# Patient Record
Sex: FEMALE | Race: WHITE | NOT HISPANIC OR LATINO
[De-identification: names, ages, dates, MRNs, and addresses within clinical notes are randomized per-mention and may not be internally consistent; named-entity substitution may affect disease eponyms.]

---

## 2018-03-19 ENCOUNTER — APPOINTMENT (OUTPATIENT)
Dept: OBGYN | Facility: CLINIC | Age: 48
End: 2018-03-19

## 2018-04-10 ENCOUNTER — APPOINTMENT (OUTPATIENT)
Dept: OBGYN | Facility: CLINIC | Age: 48
End: 2018-04-10
Payer: COMMERCIAL

## 2018-04-10 VITALS
DIASTOLIC BLOOD PRESSURE: 60 MMHG | HEIGHT: 66 IN | BODY MASS INDEX: 26.68 KG/M2 | SYSTOLIC BLOOD PRESSURE: 100 MMHG | WEIGHT: 166 LBS

## 2018-04-10 DIAGNOSIS — Z80.3 FAMILY HISTORY OF MALIGNANT NEOPLASM OF BREAST: ICD-10-CM

## 2018-04-10 DIAGNOSIS — Z00.00 ENCOUNTER FOR GENERAL ADULT MEDICAL EXAMINATION W/OUT ABNORMAL FINDINGS: ICD-10-CM

## 2018-04-10 DIAGNOSIS — Z87.19 PERSONAL HISTORY OF OTHER DISEASES OF THE DIGESTIVE SYSTEM: ICD-10-CM

## 2018-04-10 DIAGNOSIS — Z87.2 PERSONAL HISTORY OF DISEASES OF THE SKIN AND SUBCUTANEOUS TISSUE: ICD-10-CM

## 2018-04-10 PROCEDURE — 99212 OFFICE O/P EST SF 10 MIN: CPT | Mod: 25

## 2018-04-10 PROCEDURE — 99396 PREV VISIT EST AGE 40-64: CPT

## 2018-04-12 ENCOUNTER — TRANSCRIPTION ENCOUNTER (OUTPATIENT)
Age: 48
End: 2018-04-12

## 2018-04-16 LAB — CYTOLOGY CVX/VAG DOC THIN PREP: NORMAL

## 2018-04-27 ENCOUNTER — APPOINTMENT (OUTPATIENT)
Dept: ULTRASOUND IMAGING | Facility: IMAGING CENTER | Age: 48
End: 2018-04-27
Payer: COMMERCIAL

## 2018-04-27 ENCOUNTER — OUTPATIENT (OUTPATIENT)
Dept: OUTPATIENT SERVICES | Facility: HOSPITAL | Age: 48
LOS: 1 days | End: 2018-04-27
Payer: COMMERCIAL

## 2018-04-27 DIAGNOSIS — Z00.8 ENCOUNTER FOR OTHER GENERAL EXAMINATION: ICD-10-CM

## 2018-04-27 DIAGNOSIS — Z98.89 OTHER SPECIFIED POSTPROCEDURAL STATES: Chronic | ICD-10-CM

## 2018-04-27 PROCEDURE — 76856 US EXAM PELVIC COMPLETE: CPT

## 2018-04-27 PROCEDURE — 76830 TRANSVAGINAL US NON-OB: CPT | Mod: 26

## 2018-04-27 PROCEDURE — 76856 US EXAM PELVIC COMPLETE: CPT | Mod: 26

## 2018-04-27 PROCEDURE — 76830 TRANSVAGINAL US NON-OB: CPT

## 2018-05-07 ENCOUNTER — APPOINTMENT (OUTPATIENT)
Dept: OBGYN | Facility: CLINIC | Age: 48
End: 2018-05-07
Payer: COMMERCIAL

## 2018-05-07 PROCEDURE — 58100 BIOPSY OF UTERUS LINING: CPT

## 2018-07-10 LAB — CORE LAB BIOPSY: NORMAL

## 2018-07-23 ENCOUNTER — APPOINTMENT (OUTPATIENT)
Dept: OBGYN | Facility: CLINIC | Age: 48
End: 2018-07-23
Payer: COMMERCIAL

## 2018-07-23 VITALS — DIASTOLIC BLOOD PRESSURE: 64 MMHG | SYSTOLIC BLOOD PRESSURE: 100 MMHG

## 2018-07-23 DIAGNOSIS — N92.0 EXCESSIVE AND FREQUENT MENSTRUATION WITH REGULAR CYCLE: ICD-10-CM

## 2018-07-23 DIAGNOSIS — Z30.430 ENCOUNTER FOR INSERTION OF INTRAUTERINE CONTRACEPTIVE DEVICE: ICD-10-CM

## 2018-07-23 PROCEDURE — 58300 INSERT INTRAUTERINE DEVICE: CPT

## 2018-07-23 PROCEDURE — 76830 TRANSVAGINAL US NON-OB: CPT

## 2019-06-14 ENCOUNTER — EMERGENCY (EMERGENCY)
Facility: HOSPITAL | Age: 49
LOS: 1 days | Discharge: HOME | End: 2019-06-14
Admitting: EMERGENCY MEDICINE
Payer: COMMERCIAL

## 2019-06-14 ENCOUNTER — TRANSCRIPTION ENCOUNTER (OUTPATIENT)
Age: 49
End: 2019-06-14

## 2019-06-14 VITALS
SYSTOLIC BLOOD PRESSURE: 122 MMHG | HEART RATE: 60 BPM | RESPIRATION RATE: 16 BRPM | OXYGEN SATURATION: 100 % | TEMPERATURE: 97 F | DIASTOLIC BLOOD PRESSURE: 74 MMHG

## 2019-06-14 DIAGNOSIS — Z98.89 OTHER SPECIFIED POSTPROCEDURAL STATES: Chronic | ICD-10-CM

## 2019-06-14 PROCEDURE — 99283 EMERGENCY DEPT VISIT LOW MDM: CPT

## 2019-06-14 RX ORDER — CYCLOBENZAPRINE HYDROCHLORIDE 10 MG/1
1 TABLET, FILM COATED ORAL
Qty: 10 | Refills: 0
Start: 2019-06-14

## 2019-06-14 RX ORDER — CYCLOBENZAPRINE HYDROCHLORIDE 10 MG/1
5 TABLET, FILM COATED ORAL ONCE
Refills: 0 | Status: COMPLETED | OUTPATIENT
Start: 2019-06-14 | End: 2019-06-14

## 2019-06-14 RX ORDER — KETOROLAC TROMETHAMINE 30 MG/ML
30 SYRINGE (ML) INJECTION ONCE
Refills: 0 | Status: DISCONTINUED | OUTPATIENT
Start: 2019-06-14 | End: 2019-06-14

## 2019-06-14 RX ADMIN — CYCLOBENZAPRINE HYDROCHLORIDE 5 MILLIGRAM(S): 10 TABLET, FILM COATED ORAL at 21:11

## 2019-06-14 RX ADMIN — Medication 30 MILLIGRAM(S): at 21:11

## 2019-06-14 NOTE — ED PROVIDER NOTE - PMH
Chest pain, non-cardiac  associated with panic disorder  Former tobacco use  Quit 2000  Heavy tobacco smoker who smokes more than 10 cigarettes per day    Hypothyroidism (acquired)    Localized swelling of back    Mild persistent asthma without complication    Palpitations  associated with panic disorder  Panic disorder    Rheumatoid arthritis    Seasonal allergic rhinitis    Varicose veins

## 2019-06-14 NOTE — ED PROVIDER NOTE - CLINICAL SUMMARY MEDICAL DECISION MAKING FREE TEXT BOX
Pt is a 47 y/o F with PMHx of rheumatoid arthritis, anemia, anxiety, chronic back pain and hypothyroidism who presents to the ED c/o worsened B/L lower back pain and muscle spasms x2 days. Consistent with musculoskeletal back pain. Normal neuro exam. Will give pain control in ED and advise f/u with spine. Back pain x 2 day Consistent with musculoskeletal back pain. Normal neuro exam. Will give pain control in ED and advise f/u with spine.

## 2019-06-14 NOTE — ED PROVIDER NOTE - NEUROLOGICAL, MLM
Alert and oriented, no focal deficits, no motor or sensory deficits. Sensation equal and intact throughout.

## 2019-06-14 NOTE — ED PROVIDER NOTE - PHYSICAL EXAMINATION
See stress test-- needs angogram  amber done labs ordered   TTP paraspinal lumbar region. no midline TTP.  Strength 5/5, all extremities. Normal gait.

## 2019-06-14 NOTE — ED PROVIDER NOTE - NSFOLLOWUPINSTRUCTIONS_ED_ALL_ED_FT
Follow up with your Doctor in 1-2 days.  Follow up with Spine specialist in 1-2 days see attached list.  Heat to back.  Take Flexeril 5mg orally every 8 hours as needed for muscle spasm don't drive or drink alcohol while taking this medications. Stop taking Tizanidine.  Return to the ER for any persistent/worsening or new symptoms, weakness, numbness, difficulty urinating or any concerning symptoms.

## 2019-06-14 NOTE — ED PROVIDER NOTE - OBJECTIVE STATEMENT
Pt is a 49 y/o F with PMHx of rheumatoid arthritis, anemia, anxiety, chronic back pain and hypothyroidism who presents to the ED c/o worsened B/L lower back pain and muscle spasms x2 days. Her lower back pain is worse on the right than the left. Denies any fall, trauma, weakness, numbness, fecal/urinary incontinence, fever, or chills. Was previously given a muscle relaxer, tizanidine 2mg with minimal relief. 47 y/o Female with a PMHx of rheumatoid arthritis, anemia, anxiety, chronic back pain and hypothyroidism presents to the ER c/o B/L lower back pain and muscle spasms x2 days. Her lower back pain is worse on the right than the left. Denies any fall, trauma, weakness, numbness, fecal/urinary incontinence, fever, or chills. Was previously given a muscle relaxer, tizanidine 2mg with minimal relief.

## 2019-07-25 ENCOUNTER — APPOINTMENT (OUTPATIENT)
Dept: NEUROLOGY | Facility: CLINIC | Age: 49
End: 2019-07-25
Payer: COMMERCIAL

## 2019-07-25 VITALS
HEART RATE: 55 BPM | SYSTOLIC BLOOD PRESSURE: 102 MMHG | HEIGHT: 66 IN | BODY MASS INDEX: 26.68 KG/M2 | WEIGHT: 166 LBS | DIASTOLIC BLOOD PRESSURE: 66 MMHG

## 2019-07-25 DIAGNOSIS — M47.27 OTHER SPONDYLOSIS WITH RADICULOPATHY, LUMBOSACRAL REGION: ICD-10-CM

## 2019-07-25 PROCEDURE — 99245 OFF/OP CONSLTJ NEW/EST HI 55: CPT

## 2019-08-14 NOTE — HISTORY OF PRESENT ILLNESS
[FreeTextEntry1] : This is a 48-year-old right-handed woman who as had back pain, involving spasms, over the past two years. One month ago, her pain was so severe that she could not get up and walk. She has seen a chiropractor, and she has had some relief from adjustments, but still has intermittent spasms, as well as pain when standing for a long period of time. The pain is at both sides of the lower back, without radiation, and sometimes feels like stabbing.

## 2019-08-14 NOTE — DATA REVIEWED
[FreeTextEntry1] : MRI Lumbosacral Spine (7/3/19):\par - Multilevel facet joint hypertrophy/inflammation\par - Disc bulging at L3-L4 and L4-L5 with impingement upon anterior thecal sac, with mildly narrowed b/l neural foramina\par - Disc bulging and right disc herniation at L5-S1, with disc space narrowing and right nerve root impingement, as well as left neuroforaminal disc herniation

## 2019-08-14 NOTE — PHYSICAL EXAM
[General Appearance - Alert] : alert [General Appearance - In No Acute Distress] : in no acute distress [Impaired Insight] : insight and judgment were intact [Oriented To Time, Place, And Person] : oriented to person, place, and time [Affect] : the affect was normal [Person] : oriented to person [Place] : oriented to place [Time] : oriented to time [Concentration Intact] : normal concentrating ability [Visual Intact] : visual attention was ~T not ~L decreased [Naming Objects] : no difficulty naming common objects [Repeating Phrases] : no difficulty repeating a phrase [Writing A Sentence] : no difficulty writing a sentence [Fluency] : fluency intact [Comprehension] : comprehension intact [Past History] : adequate knowledge of personal past history [Reading] : reading intact [Cranial Nerves Oculomotor (III)] : extraocular motion intact [Cranial Nerves Optic (II)] : visual acuity intact bilaterally,  visual fields full to confrontation, pupils equal round and reactive to light [Cranial Nerves Trigeminal (V)] : facial sensation intact symmetrically [Cranial Nerves Facial (VII)] : face symmetrical [Cranial Nerves Vestibulocochlear (VIII)] : hearing was intact bilaterally [Cranial Nerves Glossopharyngeal (IX)] : tongue and palate midline [Cranial Nerves Accessory (XI - Cranial And Spinal)] : head turning and shoulder shrug symmetric [Cranial Nerves Hypoglossal (XII)] : there was no tongue deviation with protrusion [Motor Tone] : muscle tone was normal in all four extremities [Motor Strength] : muscle strength was normal in all four extremities [Motor Handedness Right-Handed] : the patient is right hand dominant [No Muscle Atrophy] : normal bulk in all four extremities [Paresis Pronator Drift Right-Sided] : no pronator drift on the right [Paresis Pronator Drift Left-Sided] : no pronator drift on the left [Motor Strength Upper Extremities Bilaterally] : strength was normal in both upper extremities [4] : knee flexion 4/5 [3] : knee extension 3/5 [5] : ankle plantar flexion 5/5 [Romberg's Sign] : Romberg's sign was negtive [Sensation Tactile Decrease] : light touch was intact [Sensation Pain / Temperature Decrease] : pain and temperature was intact [Abnormal Walk] : normal gait [Allodynia] : no ~T allodynia present [Balance] : balance was intact [Past-pointing] : there was no past-pointing [Tremor] : no tremor present [Coordination - Dysmetria Impaired Heel-to-Shin Bilateral] : not present [Coordination - Dysmetria Impaired Finger-to-Nose Bilateral] : not present [Dysdiadochokinesia Bilaterally] : not present [2+] : Brachioradialis left 2+ [1+] : Ankle jerk left 1+ [Plantar Reflex Right Only] : normal on the right [Plantar Reflex Left Only] : normal on the left [___] : absent on the left [___] : absent on the right [FreeTextEntry8] : Slow, narrow-based gait. Difficulty with completing specialized gaits. [FreeTextEntry6] : Lower extremity movements limited by lower back pain. [Sclera] : the sclera and conjunctiva were normal [PERRL With Normal Accommodation] : pupils were equal in size, round, reactive to light, with normal accommodation [Extraocular Movements] : extraocular movements were intact [Optic Disc Abnormality] : the optic disc were normal in size and color [Outer Ear] : the ears and nose were normal in appearance [Oropharynx] : the oropharynx was normal [Neck Appearance] : the appearance of the neck was normal [Auscultation Breath Sounds / Voice Sounds] : lungs were clear to auscultation bilaterally [Heart Rate And Rhythm] : heart rate was normal and rhythm regular [Heart Sounds] : normal S1 and S2 [Arterial Pulses Carotid] : carotid pulses were normal with no bruits [Full Pulse] : the pedal pulses are present [Edema] : there was no peripheral edema [Bowel Sounds] : normal bowel sounds [Abdomen Soft] : soft [Abdomen Tenderness] : non-tender [FreeTextEntry1] : Tenderness to palpation and middle lower back pain [Skin Turgor] : normal skin turgor [Musculoskeletal - Swelling] : no joint swelling seen [Skin Color & Pigmentation] : normal skin color and pigmentation [] : no rash

## 2019-08-28 ENCOUNTER — APPOINTMENT (OUTPATIENT)
Dept: PAIN MANAGEMENT | Facility: CLINIC | Age: 49
End: 2019-08-28

## 2019-10-13 ENCOUNTER — EMERGENCY (EMERGENCY)
Facility: HOSPITAL | Age: 49
LOS: 1 days | Discharge: HOME | End: 2019-10-13
Admitting: EMERGENCY MEDICINE
Payer: COMMERCIAL

## 2019-10-13 VITALS
OXYGEN SATURATION: 100 % | HEART RATE: 66 BPM | TEMPERATURE: 98 F | RESPIRATION RATE: 18 BRPM | DIASTOLIC BLOOD PRESSURE: 61 MMHG | SYSTOLIC BLOOD PRESSURE: 110 MMHG

## 2019-10-13 DIAGNOSIS — Z98.89 OTHER SPECIFIED POSTPROCEDURAL STATES: Chronic | ICD-10-CM

## 2019-10-13 PROCEDURE — 99282 EMERGENCY DEPT VISIT SF MDM: CPT

## 2019-10-13 NOTE — ED PROVIDER NOTE - CLINICAL SUMMARY MEDICAL DECISION MAKING FREE TEXT BOX
A:  -Eye pain, resolved  P:  -No evidence of abrasion, asymptomatic presently. Stable to be discharged, f/u with Ophtho clinic as needed

## 2019-10-13 NOTE — ED ADULT TRIAGE NOTE - CHIEF COMPLAINT QUOTE
visual disturbance and pain    pt states for approx 4 hrs, has pain left eye, blurry vision (resolved), states has a white spot on her eye.  had similar episode last year... 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

## 2019-10-13 NOTE — ED PROVIDER NOTE - OBJECTIVE STATEMENT
48yo F w/ pmhx of RA presents to the ED c/o L eye pain/visual change x 4 hours. Pt previously had developed a corneal ulcer which was attributed to the RA requiring prednisone/drops. Pt states for approx 4 hrs prior to arrival she experienced discomfort and blurry vision which has now resolved. Pt states her daughter looked in the eye and saw a white spot on the cornea. Denies any symptoms presently. No foreign body sensation, no contact lens use. No HA, dizziness, neck pain, fevers, chills.

## 2019-10-13 NOTE — ED PROVIDER NOTE - PLAN OF CARE
Advance activity as tolerated.  Continue all previously prescribed medications as directed unless otherwise instructed.  Follow up with your primary care physician in 48-72 hours- bring copies of your results.  Return to the ER for worsening or persistent symptoms, and/or ANY NEW OR CONCERNING SYMPTOMS. If you have issues obtaining follow up, please call: [phone] to obtain a doctor or specialist who takes your insurance in your area.  You may call [phone] to make an appointment with the internal medicine clinic.

## 2019-10-13 NOTE — ED PROVIDER NOTE - NSFOLLOWUPCLINICS_GEN_ALL_ED_FT
[facility]  Ophthalmology  [address] [place] [address] [id number]  [place] [address]  Phone: [phone]  Fax:   Follow Up Time:

## 2019-10-13 NOTE — ED PROVIDER NOTE - CARE PLAN
Principal Discharge DX:	Eye pain  Assessment and plan of treatment:	Advance activity as tolerated.  Continue all previously prescribed medications as directed unless otherwise instructed.  Follow up with your primary care physician in 48-72 hours- bring copies of your results.  Return to the ER for worsening or persistent symptoms, and/or ANY NEW OR CONCERNING SYMPTOMS. If you have issues obtaining follow up, please call: [phone] to obtain a doctor or specialist who takes your insurance in your area.  You may call [phone] to make an appointment with the internal medicine clinic.

## 2019-10-13 NOTE — ED PROVIDER NOTE - PATIENT PORTAL LINK FT
You can access the FollowMyHealth Patient Portal offered by [facility] by registering at the following website: [email or web address]. By joining [employer]’s FollowMyHealth portal, you will also be able to view your health information using other applications (apps) compatible with our system.

## 2019-10-13 NOTE — ED PROVIDER NOTE - NSFOLLOWUPINSTRUCTIONS_ED_ALL_ED_FT
Advance activity as tolerated.  Continue all previously prescribed medications as directed unless otherwise instructed.  Follow up with your primary care physician in 48-72 hours- bring copies of your results.  Return to the ER for worsening or persistent symptoms, and/or ANY NEW OR CONCERNING SYMPTOMS. If you have issues obtaining follow up, please call: [phone] to obtain a doctor or specialist who takes your insurance in your area.  You may call [phone] to make an appointment with the internal medicine clinic.

## 2020-01-03 NOTE — ED ADULT TRIAGE NOTE - ACCOMPANIED BY
Labs were available at the time of the encounter and results were discussed in the office.  Repeat in late January.  Not taking diuretics at this time. Self

## 2021-01-19 ENCOUNTER — NON-APPOINTMENT (OUTPATIENT)
Age: 51
End: 2021-01-19

## 2021-03-03 ENCOUNTER — APPOINTMENT (OUTPATIENT)
Dept: OBGYN | Facility: CLINIC | Age: 51
End: 2021-03-03
Payer: COMMERCIAL

## 2021-03-03 VITALS
BODY MASS INDEX: 27.32 KG/M2 | DIASTOLIC BLOOD PRESSURE: 72 MMHG | WEIGHT: 170 LBS | HEIGHT: 66 IN | SYSTOLIC BLOOD PRESSURE: 114 MMHG

## 2021-03-03 DIAGNOSIS — N84.0 POLYP OF CORPUS UTERI: ICD-10-CM

## 2021-03-03 DIAGNOSIS — Z01.411 ENCOUNTER FOR GYNECOLOGICAL EXAMINATION (GENERAL) (ROUTINE) WITH ABNORMAL FINDINGS: ICD-10-CM

## 2021-03-03 PROCEDURE — 58301 REMOVE INTRAUTERINE DEVICE: CPT

## 2021-03-03 PROCEDURE — 99396 PREV VISIT EST AGE 40-64: CPT | Mod: 25

## 2021-03-03 PROCEDURE — 99072 ADDL SUPL MATRL&STAF TM PHE: CPT

## 2021-03-03 RX ORDER — IRON/IRON ASP GLY/FA/MV-MIN 38 125-25-1MG
TABLET ORAL
Refills: 0 | Status: ACTIVE | COMMUNITY

## 2021-03-03 RX ORDER — PREGABALIN 75 MG/1
75 CAPSULE ORAL
Qty: 90 | Refills: 2 | Status: DISCONTINUED | COMMUNITY
Start: 2019-07-25 | End: 2021-03-03

## 2021-03-03 RX ORDER — FEXOFENADINE HCL 180 MG
180 TABLET ORAL DAILY
Refills: 0 | Status: DISCONTINUED | COMMUNITY
End: 2021-03-03

## 2021-03-08 LAB — CYTOLOGY CVX/VAG DOC THIN PREP: NORMAL

## 2021-11-22 NOTE — ED ADULT TRIAGE NOTE - ARRIVAL FROM
Home You can access the FollowMyHealth Patient Portal offered by [facility] by registering at the following website: [email or web address]. By joining [employer]’s FollowMyHealth portal, you will also be able to view your health information using other applications (apps) compatible with our system.

## 2022-05-25 ENCOUNTER — EMERGENCY (EMERGENCY)
Facility: HOSPITAL | Age: 52
LOS: 1 days | Discharge: HOME | End: 2022-05-25
Attending: EMERGENCY MEDICINE | Admitting: EMERGENCY MEDICINE
Payer: COMMERCIAL

## 2022-05-25 VITALS
TEMPERATURE: 98 F | HEIGHT: 66 IN | HEART RATE: 68 BPM | DIASTOLIC BLOOD PRESSURE: 68 MMHG | OXYGEN SATURATION: 100 % | RESPIRATION RATE: 18 BRPM | SYSTOLIC BLOOD PRESSURE: 94 MMHG

## 2022-05-25 DIAGNOSIS — Z98.89 OTHER SPECIFIED POSTPROCEDURAL STATES: Chronic | ICD-10-CM

## 2022-05-25 PROCEDURE — 99053 MED SERV 10PM-8AM 24 HR FAC: CPT

## 2022-05-25 PROCEDURE — 99282 EMERGENCY DEPT VISIT SF MDM: CPT

## 2022-05-25 NOTE — ED ADULT TRIAGE NOTE - CHIEF COMPLAINT QUOTE
c/o right sided facial and tooth pain [id number]. states she noticed blood in mouth earlier and "is worried because she is anemic." no swelling, bleeding, or redness noted.  hx of RA, Anemia, hypothyroid, asthma

## 2022-05-26 VITALS
HEART RATE: 54 BPM | DIASTOLIC BLOOD PRESSURE: 58 MMHG | RESPIRATION RATE: 17 BRPM | TEMPERATURE: 98 F | OXYGEN SATURATION: 99 % | SYSTOLIC BLOOD PRESSURE: 94 MMHG

## 2022-05-26 NOTE — ED PROVIDER NOTE - ATTENDING CONTRIBUTION TO CARE
HPI: 51 y F, hx of RA on methotrexate, asthma, gerd, hypothyroidism, presenting w/ right lower dental pain. sudden onset 2 hours pta. no remarkable preceding trauma. had bleeding and pain associated with the site. pain much better after she took x2 advil prior to coming here. bleeding resolved, but the combination of symptoms prompted their arrival here.  EXAM: NAD, speaking full sentences, oropharynx normal, multiple dental caries, tooth 29 with type 2 gay fx, no root exposed at this time, no bleeding, no gum abscess palpable.   MDM: Pt with dental pain x 2 hours found to have a chipped tooth without any root exposure on exam and no longer bleeding. Pain improved without meds here in ED (pt took meds prior to arrival). Will discharge to have pt f/u with her dentist tomorrow.  states he will bring pt to her dentist at 9 am when open. Will discharge. rec PO pain med PRN.

## 2022-05-26 NOTE — ED ADULT NURSE NOTE - CHIEF COMPLAINT QUOTE
c/o right sided facial and tooth pain [id number]. states she noticed blood in mouth earlier and "is worried because she is anemic." no swelling, bleeding, or redness noted.  hx of RA, Anemia, hypothyroid, asthma

## 2022-05-26 NOTE — ED ADULT NURSE NOTE - NSIMPLEMENTINTERV_GEN_ALL_ED
Implemented All Universal Safety Interventions:  [place] to call system. Call bell, personal items and telephone within reach. Instruct patient to call for assistance. Room bathroom lighting operational. Non-slip footwear when patient is off stretcher. Physically safe environment: no spills, clutter or unnecessary equipment. Stretcher in lowest position, wheels locked, appropriate side rails in place.

## 2022-05-26 NOTE — ED PROVIDER NOTE - NSICDXFAMILYHX_GEN_ALL_CORE_FT
FAMILY HISTORY:  Father  Still living? No  Family history of lymphoma, Age at diagnosis: Age Unknown    Mother  Still living? Yes, Estimated age: Age Unknown  Family history of essential hypertension, Age at diagnosis: Age Unknown    Grandparent  Still living? No  Family history of heart disease, Age at diagnosis: Age Unknown

## 2022-05-26 NOTE — ED PROVIDER NOTE - OBJECTIVE STATEMENT
51 y F, hx of RA on methotrexate, asthma, gerd, hypothyroidism, presenting w/ right lower dental pain. sudden onset 2 hours pta. no remarkable preceding trauma. had bleeding and pain associated with the site. pain much better after she took x2 advil prior to coming here. bleeding resolved, but the combination of symptoms prompted their arrival here. allergy to penicillin = hives.

## 2022-05-26 NOTE — ED PROVIDER NOTE - NSFOLLOWUPINSTRUCTIONS_ED_ALL_ED_FT
Dental Pain       Dental pain is often a sign that something is wrong with your teeth or gums. You can also have pain after a dental treatment. If you have dental pain, it is important to contact your dentist, especially if the cause of the pain is not known. Dental pain may hurt a lot or a little and can be caused by many things, including:  •Tooth decay (cavities or caries).  •Infection.  •The inner part of the tooth being filled with pus (an abscess).  •Injury.  •A crack in the tooth.  •Gums that move back and expose the root of a tooth.  •Gum disease.  •Abnormal grinding or clenching of teeth.  •Not taking good care of your teeth.      Sometimes the cause of pain is not known.    You may have pain all the time, or it may happen only when you are:  •Chewing.  •Exposed to hot or cold temperatures.  •Eating or drinking foods or drinks that have a lot of sugar in them, such as soda or candy.      Follow these instructions at home:    Medicines   •Take over-the-counter and prescription medicines only as told by your dentist.  •If you were prescribed an antibiotic medicine, take it as told by your dentist. Do not stop taking it even if you start to feel better.      Eating and drinking     Do not eat foods or drinks that cause you pain. These include:  •Very hot or very cold foods or drinks.  •Sweet or sugary foods or drinks.      Managing pain and swelling  •If told, put ice on the painful area of your face. To do this:  •Put ice in a plastic bag.  •Place a towel between your skin and the bag.   •Leave the ice on for 20 minutes, 2–3 times a day.  •Take off the ice if your skin turns bright red. This is very important. If you cannot feel pain, heat, or cold, you have a greater risk of damage to the area.    Brushing your teeth  •Brush your teeth twice a day using a fluoride toothpaste.   •Use a toothpaste made for sensitive teeth as told by your dentist.  •Use a soft toothbrush.    General instructions  •Floss your teeth at least once a day.  •Do not put heat on the outside of your face.  •Rinse your mouth often with salt water. To make salt water, dissolve ½–1 tsp (3–6 g) of salt in 1 cup (237 mL) of warm water.  •Watch your dental pain. Let your dentist know if there are any changes.  •Keep all follow-up visits.      Contact a dentist if:  •You have dental pain and you do not know why.  •Medicine does not help your pain.  •Your symptoms get worse.  •You have new symptoms.    Get help right away if:  •You cannot open your mouth.  •You are having trouble breathing or swallowing.  •You have a fever.  •Your face, neck, or jaw is swollen.      These symptoms may be an emergency. Get help right away. Call your local emergency services (911 in the [place]   • Do not wait to see if the symptoms will go away.   • Do not drive yourself to the hospital.       Summary  •Dental pain may be caused by many things, including tooth decay, injury, or infection. In some cases, the cause is not known.  •Dental pain may hurt a lot or very little. You may have pain all the time, or you may have it only when you eat or drink.  •Take over-the-counter and prescription medicines only as told by your dentist.  •Watch your dental pain for any changes. Let your dentist know if symptoms get worse.      This information is not intended to replace advice given to you by your health care provider. Make sure you discuss any questions you have with your health care provider.

## 2022-05-26 NOTE — ED ADULT NURSE NOTE - OBJECTIVE STATEMENT
pt received to room 23, a&ox 4 and ambulatory, p/w right face and tooth pain x 2 hours. Pt stated she has a dental appointment tomorrow but noticed some bleeding from mouth and decided to come to the ED for eval. No SOB noted. Pt breathing even and unlabored on room air. Denies fever, chills, cough, SOB, chest pain, palpitations, dizziness. MD orders pending. Stretcher in lowest position, wheels locked, appropriate side rails in place, call bell in reach.

## 2022-05-26 NOTE — ED PROVIDER NOTE - PHYSICAL EXAMINATION
Gen: NAD, non-toxic appearing  Head: normal appearing  HEENT: normal conjunctiva, tooth # 28 is fracture w/ extension passed the enamel and into the dentin, no clear exposure of the pulp, no active bleeding, no mucosal erythema or swelling, no abscess formation.   Lung: no respiratory distress, speaking in full sentences, ctab      CV: regular rate and rhythm, no murmurs  MSK: no visible deformities  Neuro: alert and grossly oriented, no gross motor deficits  Skin: No alex rashes

## 2022-05-26 NOTE — ED PROVIDER NOTE - CLINICAL SUMMARY MEDICAL DECISION MAKING FREE TEXT BOX
BETTY Hawkins. PGY-3: 50 y/o F presenting with suspected tooth fracture, no pulp exposure or signs of infection. pain improved after taking ibuprofen prior to arrival, bleeding resolved. patient to f/u with dental in AM

## 2022-05-26 NOTE — ED PROVIDER NOTE - NSICDXPASTMEDICALHX_GEN_ALL_CORE_FT
PAST MEDICAL HISTORY:  Chest pain, non-cardiac associated with panic disorder    Former tobacco use Quit 2000    Heavy tobacco smoker who smokes more than 10 cigarettes per day     Hypothyroidism (acquired)     Localized swelling of back     Mild persistent asthma without complication     Palpitations associated with panic disorder    Panic disorder     Rheumatoid arthritis     Seasonal allergic rhinitis     Varicose veins

## 2022-05-26 NOTE — ED PROVIDER NOTE - NS ED ROS FT
GENERAL: no fever  EYES: no eye pain  HEENT: + tooth pain  CARDIAC: no chest pain  PULMONARY: no SOB  GI: no abdominal pain  : no dysuria  SKIN: no rashes  NEURO: no headache  MSK: no new joint pain

## 2022-05-26 NOTE — ED PROVIDER NOTE - PATIENT PORTAL LINK FT
You can access the FollowMyHealth Patient Portal offered by [facility] by registering at the following website: [email or web address]. By joining [employer]’s FollowMyHealth portal, you will also be able to view your health information using other applications (apps) compatible with our system.

## 2022-09-15 ENCOUNTER — EMERGENCY (EMERGENCY)
Facility: HOSPITAL | Age: 52
LOS: 1 days | Discharge: HOME | End: 2022-09-15
Attending: EMERGENCY MEDICINE
Payer: COMMERCIAL

## 2022-09-15 VITALS
DIASTOLIC BLOOD PRESSURE: 66 MMHG | TEMPERATURE: 98 F | SYSTOLIC BLOOD PRESSURE: 95 MMHG | HEART RATE: 66 BPM | RESPIRATION RATE: 18 BRPM | OXYGEN SATURATION: 97 %

## 2022-09-15 VITALS
SYSTOLIC BLOOD PRESSURE: 60 MMHG | HEIGHT: 66 IN | RESPIRATION RATE: 17 BRPM | OXYGEN SATURATION: 97 % | WEIGHT: 151.9 LBS | TEMPERATURE: 98 F | HEART RATE: 102 BPM | DIASTOLIC BLOOD PRESSURE: 68 MMHG

## 2022-09-15 DIAGNOSIS — Z98.89 OTHER SPECIFIED POSTPROCEDURAL STATES: Chronic | ICD-10-CM

## 2022-09-15 PROCEDURE — 99283 EMERGENCY DEPT VISIT LOW MDM: CPT

## 2022-09-15 PROCEDURE — 99284 EMERGENCY DEPT VISIT MOD MDM: CPT

## 2022-09-15 RX ORDER — MULTIVIT WITH MIN/MFOLATE/K2 340-15/3 G
1 POWDER (GRAM) ORAL ONCE
Refills: 0 | Status: DISCONTINUED | OUTPATIENT
Start: 2022-09-15 | End: 2022-09-19

## 2022-09-15 RX ORDER — MULTIVIT WITH MIN/MFOLATE/K2 340-15/3 G
300 POWDER (GRAM) ORAL
Qty: 300 | Refills: 0
Start: 2022-09-15

## 2022-09-15 NOTE — ED PROVIDER NOTE - OBJECTIVE STATEMENT
52 yr old female with hx of external hemorrhoids presents to ed c/o constipation along with rectal pain and bleeding x 4 days. no fever, no purulent discharge, no abd pain. pt using preparation H.

## 2022-09-15 NOTE — ED PROVIDER NOTE - NSFOLLOWUPINSTRUCTIONS_ED_ALL_ED_FT
prescribed magnesium citrate. continue using miralax and preparation H. donut seat for comfort. avoid straining. return if any fever, severe pain, purulent discharge or worsening symptoms even after having Bowel movement.

## 2022-09-15 NOTE — ED PROVIDER NOTE - CLINICAL SUMMARY MEDICAL DECISION MAKING FREE TEXT BOX
52 yr old female with hx of external hemorrhoids presents to ed c/o constipation along with rectal pain and bleeding x 4 days. no fever, no purulent discharge, no abd pain. pt using preparation H.     pt with constipation- no sign of abscess or infection. clinically no thrombosed hemorrhoid- fleet enema. prescribed magnesium citrate. continue using miralax and preparation H. donut seat for comfort. avoid straining. return if any fever, severe pain, purulent discharge or worsening symptoms even after having Bowel movement.

## 2022-09-15 NOTE — ED PROVIDER NOTE - GASTROINTESTINAL, MLM
Abdomen soft, non-tender, no guarding. rectum- impacted, external hemorrhoid that is non-thrombosed. no fissure or abscess.

## 2022-09-15 NOTE — ED PROVIDER NOTE - PATIENT PORTAL LINK FT
You can access the FollowMyHealth Patient Portal offered by [facility] by registering at the following website: [email or web address]. By joining [employer]’s FollowMyHealth portal, you will also be able to view your health information using other applications (apps) compatible with our system.

## 2023-08-30 ENCOUNTER — INPATIENT (INPATIENT)
Facility: HOSPITAL | Age: 53
LOS: 0 days | Discharge: HOME | DRG: 69 | End: 2023-08-31
Attending: STUDENT IN AN ORGANIZED HEALTH CARE EDUCATION/TRAINING PROGRAM | Admitting: STUDENT IN AN ORGANIZED HEALTH CARE EDUCATION/TRAINING PROGRAM
Payer: MEDICAID

## 2023-08-30 VITALS — WEIGHT: 157.19 LBS

## 2023-08-30 DIAGNOSIS — G45.9 TRANSIENT CEREBRAL ISCHEMIC ATTACK, UNSPECIFIED: ICD-10-CM

## 2023-08-30 DIAGNOSIS — Z98.89 OTHER SPECIFIED POSTPROCEDURAL STATES: Chronic | ICD-10-CM

## 2023-08-30 DIAGNOSIS — J45.909 UNSPECIFIED ASTHMA, UNCOMPLICATED: ICD-10-CM

## 2023-08-30 DIAGNOSIS — Z29.9 ENCOUNTER FOR PROPHYLACTIC MEASURES, UNSPECIFIED: ICD-10-CM

## 2023-08-30 DIAGNOSIS — I63.9 CEREBRAL INFARCTION, UNSPECIFIED: ICD-10-CM

## 2023-08-30 DIAGNOSIS — E03.9 HYPOTHYROIDISM, UNSPECIFIED: ICD-10-CM

## 2023-08-30 DIAGNOSIS — F41.9 ANXIETY DISORDER, UNSPECIFIED: ICD-10-CM

## 2023-08-30 DIAGNOSIS — M06.9 RHEUMATOID ARTHRITIS, UNSPECIFIED: ICD-10-CM

## 2023-08-30 LAB
ALBUMIN SERPL ELPH-MCNC: 3.5 G/DL — SIGNIFICANT CHANGE UP (ref 3.5–5)
ALP SERPL-CCNC: 86 U/L — SIGNIFICANT CHANGE UP (ref 40–120)
ALT FLD-CCNC: 13 U/L DA — SIGNIFICANT CHANGE UP (ref 10–60)
ANION GAP SERPL CALC-SCNC: 4 MMOL/L — LOW (ref 5–17)
APTT BLD: 32.7 SEC — SIGNIFICANT CHANGE UP (ref 24.5–35.6)
AST SERPL-CCNC: 12 U/L — SIGNIFICANT CHANGE UP (ref 10–40)
BASOPHILS # BLD AUTO: 0.06 K/UL — SIGNIFICANT CHANGE UP (ref 0–0.2)
BASOPHILS NFR BLD AUTO: 1 % — SIGNIFICANT CHANGE UP (ref 0–2)
BILIRUB SERPL-MCNC: 0.3 MG/DL — SIGNIFICANT CHANGE UP (ref 0.2–1.2)
BUN SERPL-MCNC: 13 MG/DL — SIGNIFICANT CHANGE UP (ref 7–18)
CALCIUM SERPL-MCNC: 9.1 MG/DL — SIGNIFICANT CHANGE UP (ref 8.4–10.5)
CHLORIDE SERPL-SCNC: 108 MMOL/L — SIGNIFICANT CHANGE UP (ref 96–108)
CO2 SERPL-SCNC: 28 MMOL/L — SIGNIFICANT CHANGE UP (ref 22–31)
CREAT SERPL-MCNC: 0.93 MG/DL — SIGNIFICANT CHANGE UP (ref 0.5–1.3)
EGFR: 73 ML/MIN/1.73M2 — SIGNIFICANT CHANGE UP
EOSINOPHIL # BLD AUTO: 0.54 K/UL — HIGH (ref 0–0.5)
EOSINOPHIL NFR BLD AUTO: 9.3 % — HIGH (ref 0–6)
GLUCOSE SERPL-MCNC: 99 MG/DL — SIGNIFICANT CHANGE UP (ref 70–99)
HCG SERPL-ACNC: 4 MIU/ML — SIGNIFICANT CHANGE UP
HCT VFR BLD CALC: 36.8 % — SIGNIFICANT CHANGE UP (ref 34.5–45)
HGB BLD-MCNC: 12.6 G/DL — SIGNIFICANT CHANGE UP (ref 11.5–15.5)
IMM GRANULOCYTES NFR BLD AUTO: 0.3 % — SIGNIFICANT CHANGE UP (ref 0–0.9)
INR BLD: 1.04 RATIO — SIGNIFICANT CHANGE UP (ref 0.85–1.18)
LYMPHOCYTES # BLD AUTO: 1.63 K/UL — SIGNIFICANT CHANGE UP (ref 1–3.3)
LYMPHOCYTES # BLD AUTO: 28.2 % — SIGNIFICANT CHANGE UP (ref 13–44)
MCHC RBC-ENTMCNC: 31.8 PG — SIGNIFICANT CHANGE UP (ref 27–34)
MCHC RBC-ENTMCNC: 34.2 GM/DL — SIGNIFICANT CHANGE UP (ref 32–36)
MCV RBC AUTO: 92.9 FL — SIGNIFICANT CHANGE UP (ref 80–100)
MONOCYTES # BLD AUTO: 0.57 K/UL — SIGNIFICANT CHANGE UP (ref 0–0.9)
MONOCYTES NFR BLD AUTO: 9.9 % — SIGNIFICANT CHANGE UP (ref 2–14)
NEUTROPHILS # BLD AUTO: 2.96 K/UL — SIGNIFICANT CHANGE UP (ref 1.8–7.4)
NEUTROPHILS NFR BLD AUTO: 51.3 % — SIGNIFICANT CHANGE UP (ref 43–77)
NRBC # BLD: 0 /100 WBCS — SIGNIFICANT CHANGE UP (ref 0–0)
PLATELET # BLD AUTO: 229 K/UL — SIGNIFICANT CHANGE UP (ref 150–400)
POTASSIUM SERPL-MCNC: 4.4 MMOL/L — SIGNIFICANT CHANGE UP (ref 3.5–5.3)
POTASSIUM SERPL-SCNC: 4.4 MMOL/L — SIGNIFICANT CHANGE UP (ref 3.5–5.3)
PROT SERPL-MCNC: 7.1 G/DL — SIGNIFICANT CHANGE UP (ref 6–8.3)
PROTHROM AB SERPL-ACNC: 11.8 SEC — SIGNIFICANT CHANGE UP (ref 9.5–13)
RBC # BLD: 3.96 M/UL — SIGNIFICANT CHANGE UP (ref 3.8–5.2)
RBC # FLD: 11.7 % — SIGNIFICANT CHANGE UP (ref 10.3–14.5)
SODIUM SERPL-SCNC: 140 MMOL/L — SIGNIFICANT CHANGE UP (ref 135–145)
TROPONIN I, HIGH SENSITIVITY RESULT: 4.3 NG/L — SIGNIFICANT CHANGE UP
WBC # BLD: 5.78 K/UL — SIGNIFICANT CHANGE UP (ref 3.8–10.5)
WBC # FLD AUTO: 5.78 K/UL — SIGNIFICANT CHANGE UP (ref 3.8–10.5)

## 2023-08-30 PROCEDURE — 99223 1ST HOSP IP/OBS HIGH 75: CPT

## 2023-08-30 PROCEDURE — 0042T: CPT | Mod: MA

## 2023-08-30 PROCEDURE — 70450 CT HEAD/BRAIN W/O DYE: CPT | Mod: 26,MA,59

## 2023-08-30 PROCEDURE — 99285 EMERGENCY DEPT VISIT HI MDM: CPT

## 2023-08-30 PROCEDURE — 70496 CT ANGIOGRAPHY HEAD: CPT | Mod: 26,MA

## 2023-08-30 PROCEDURE — 70498 CT ANGIOGRAPHY NECK: CPT | Mod: 26,MA

## 2023-08-30 RX ORDER — CLOPIDOGREL BISULFATE 75 MG/1
75 TABLET, FILM COATED ORAL DAILY
Refills: 0 | Status: DISCONTINUED | OUTPATIENT
Start: 2023-08-30 | End: 2023-08-31

## 2023-08-30 RX ORDER — LEVOTHYROXINE SODIUM 125 MCG
100 TABLET ORAL DAILY
Refills: 0 | Status: DISCONTINUED | OUTPATIENT
Start: 2023-08-30 | End: 2023-08-31

## 2023-08-30 RX ORDER — LEVOTHYROXINE SODIUM 125 MCG
1 TABLET ORAL
Refills: 0 | DISCHARGE

## 2023-08-30 RX ORDER — CLONAZEPAM 1 MG
0.5 TABLET ORAL DAILY
Refills: 0 | Status: DISCONTINUED | OUTPATIENT
Start: 2023-08-30 | End: 2023-08-31

## 2023-08-30 RX ORDER — PANTOPRAZOLE SODIUM 20 MG/1
40 TABLET, DELAYED RELEASE ORAL
Refills: 0 | Status: DISCONTINUED | OUTPATIENT
Start: 2023-08-30 | End: 2023-08-31

## 2023-08-30 RX ORDER — ENOXAPARIN SODIUM 100 MG/ML
40 INJECTION SUBCUTANEOUS EVERY 24 HOURS
Refills: 0 | Status: DISCONTINUED | OUTPATIENT
Start: 2023-08-30 | End: 2023-08-31

## 2023-08-30 RX ORDER — METHOTREXATE 2.5 MG/1
2.5 TABLET ORAL
Refills: 0 | Status: DISCONTINUED | OUTPATIENT
Start: 2023-08-30 | End: 2023-08-31

## 2023-08-30 RX ORDER — ASPIRIN/CALCIUM CARB/MAGNESIUM 324 MG
81 TABLET ORAL DAILY
Refills: 0 | Status: DISCONTINUED | OUTPATIENT
Start: 2023-08-30 | End: 2023-08-31

## 2023-08-30 RX ORDER — FOLIC ACID 0.8 MG
1 TABLET ORAL
Refills: 0 | DISCHARGE

## 2023-08-30 RX ORDER — ATORVASTATIN CALCIUM 80 MG/1
80 TABLET, FILM COATED ORAL AT BEDTIME
Refills: 0 | Status: DISCONTINUED | OUTPATIENT
Start: 2023-08-30 | End: 2023-08-31

## 2023-08-30 RX ORDER — ALBUTEROL 90 UG/1
2 AEROSOL, METERED ORAL EVERY 6 HOURS
Refills: 0 | Status: DISCONTINUED | OUTPATIENT
Start: 2023-08-30 | End: 2023-08-31

## 2023-08-30 RX ORDER — FOLIC ACID 0.8 MG
1 TABLET ORAL DAILY
Refills: 0 | Status: DISCONTINUED | OUTPATIENT
Start: 2023-08-30 | End: 2023-08-31

## 2023-08-30 RX ORDER — FERROUS SULFATE 325(65) MG
325 TABLET ORAL DAILY
Refills: 0 | Status: DISCONTINUED | OUTPATIENT
Start: 2023-08-30 | End: 2023-08-31

## 2023-08-30 RX ORDER — ACETAMINOPHEN 500 MG
650 TABLET ORAL EVERY 6 HOURS
Refills: 0 | Status: DISCONTINUED | OUTPATIENT
Start: 2023-08-30 | End: 2023-08-31

## 2023-08-30 RX ORDER — KETOROLAC TROMETHAMINE 30 MG/ML
15 SYRINGE (ML) INJECTION ONCE
Refills: 0 | Status: DISCONTINUED | OUTPATIENT
Start: 2023-08-30 | End: 2023-08-30

## 2023-08-30 RX ORDER — FERROUS SULFATE 325(65) MG
1 TABLET ORAL
Refills: 0 | DISCHARGE

## 2023-08-30 RX ADMIN — CLOPIDOGREL BISULFATE 75 MILLIGRAM(S): 75 TABLET, FILM COATED ORAL at 18:56

## 2023-08-30 RX ADMIN — ENOXAPARIN SODIUM 40 MILLIGRAM(S): 100 INJECTION SUBCUTANEOUS at 22:06

## 2023-08-30 RX ADMIN — PANTOPRAZOLE SODIUM 40 MILLIGRAM(S): 20 TABLET, DELAYED RELEASE ORAL at 22:06

## 2023-08-30 RX ADMIN — ATORVASTATIN CALCIUM 80 MILLIGRAM(S): 80 TABLET, FILM COATED ORAL at 22:06

## 2023-08-30 RX ADMIN — Medication 1 MILLIGRAM(S): at 18:56

## 2023-08-30 RX ADMIN — Medication 81 MILLIGRAM(S): at 18:57

## 2023-08-30 RX ADMIN — Medication 325 MILLIGRAM(S): at 18:56

## 2023-08-30 RX ADMIN — Medication 0.5 MILLIGRAM(S): at 18:57

## 2023-08-30 NOTE — ED ADULT TRIAGE NOTE - CHIEF COMPLAINT QUOTE
left sided weakness with numbness onset 12Ppm today dizziness x 1hour PTA, last seen normal 6am today

## 2023-08-30 NOTE — ED PROVIDER NOTE - OBJECTIVE STATEMENT
53-year-old female history of hypothyroid, rheumatoid arthritis, asthma, and anxiety presents with left-sided weakness.  Last known well was 6 AM this morning.  As per patient around noon the symptoms worsened.  Symptoms associated with left-sided facial numbness as well.  No chest pain no shortness of breath no nausea no vomiting.  Upon arrival to ED fingerstick noted.  Stroke code called for deficit.

## 2023-08-30 NOTE — ED ADULT TRIAGE NOTE - BEFAST ARM NUMBNESS
Check tsh  Wants to hold off on us due to covid  Will let me know when she comfortable going in   Yes

## 2023-08-30 NOTE — ED PROVIDER NOTE - PROGRESS NOTE DETAILS
pt reassessed upon return from CT.  Pt ambulatory, and was walking to bathroom.  pt says weakness is improving. ? TIA

## 2023-08-30 NOTE — H&P ADULT - NSHPPHYSICALEXAM_GEN_ALL_CORE
PHYSICAL EXAM:  GENERAL: NAD, speaks in full sentences, no signs of respiratory distress  HEAD:  Atraumatic, Normocephalic  EYES: EOMI, PERRLA, conjunctiva and sclera clear  NECK: Supple, No JVD  CHEST/LUNG: Clear to auscultation bilaterally; No wheeze; No crackles; No accessory muscles used  HEART: Regular rate and rhythm; No murmurs;   ABDOMEN: Soft, Nontender, Nondistended; Bowel sounds present; No guarding  EXTREMITIES:  2+ Peripheral Pulses, No cyanosis or edema  SKIN: No rashes or lesions  NERVOUS SYSTEM:  Alert & Oriented X3,  Cranial nerves:  CN II: Visual fields are full to confrontation. Pupils are 3-4 mm and briskly reactive to light.   CN III, IV, VI: intact EOM  CN V: Facial sensation altered in left side of face  CN VII: Face is symmetric with normal eye closure and smile.  CN VIII: Hearing is normal  CN IX, X: Palate elevates symmetrically. Phonation is normal.  CN XI: Head turning and shoulder shrug are intact  CN XII: Tongue is midline with normal movements and no atrophy.  Motor: 5/5 strength on all extremities  Sensory: altered sensation on left side of the body, involving face too; right side sensation intact  Reflexes: 2/2 bicep, patellar  Coordination:  Rapid alternating movements and fine finger movements are intact. There is no dysmetria on finger-to-nose and heel-knee-shin. There are no abnormal or extraneous movements. Romberg is absent.  Gait/Stance:  Posture is normal. Gait is steady with normal steps, base, arm swing, and turning.  Meningeal Signs: Negative for Brudzinski and Kernig signs

## 2023-08-30 NOTE — H&P ADULT - ATTENDING COMMENTS
53F PMH hypothyroid, RA, asthma, anxiety, p/w cc LUE and LLE weakness since 6AM and L facial numbness now resolved. Admission for TIA.    AP  TIA  Hypothyroid  RA  Asthma  Anxiety    -NIHSS = 3  -L sided weakness 9h prior to presentation, now resolved  -CTH and CTA reviewed, no significant findings  -consult neuro  -**If MRI recommended, prescribe 1-2mg IVP ativan before  -rope score 6-7, obtain TTE with bubble study  -monitor on tele  -start DAPT, statin  -PPI for gi ppx  -PT eval  -no dysphagia, start diet  -resume home meds  -dvt ppx

## 2023-08-30 NOTE — H&P ADULT - PROBLEM SELECTOR PLAN 1
Last well known 6am this morning  CT head showed no signs of ischemia, hemorrhage, mass  Aspirin 81mg PO Daily (or Aspirin 300mg MN daily if NPO)  Clopidogrel 75mg PO Daily x 21 days  Atorvastatin 80mg PO QHS (may adjust dose to achieve goal LDL < 70 mg/dL)  Neurology consulted, Dr Craven Last well known 6am this morning  CT head showed no signs of ischemia, hemorrhage, mass  Aspirin 81mg PO Daily (or Aspirin 300mg MD daily if NPO)  Clopidogrel 75mg PO Daily x 21 days  Atorvastatin 80mg PO QHS (may adjust dose to achieve goal LDL < 70 mg/dL)  F/u Echo  PT evaluation  Neurology consulted, Dr Craven Last well known 6am this morning  CT head showed no signs of ischemia, hemorrhage, mass  Aspirin 81mg PO Daily (or Aspirin 300mg VT daily if NPO)  Clopidogrel 75mg PO Daily x 21 days  Atorvastatin 80mg PO QHS (may adjust dose to achieve goal LDL < 70 mg/dL)  F/u Echo w/ bubble study  PT evaluation  Neurology consulted, Dr Craven

## 2023-08-30 NOTE — ED PROVIDER NOTE - CLINICAL SUMMARY MEDICAL DECISION MAKING FREE TEXT BOX
54 y/o female presents to ED with left sided weakness.  Stroke code called.  Pt outside window for tPA  however candidate for endovascular rescue.

## 2023-08-30 NOTE — ED ADULT NURSE NOTE - OBJECTIVE STATEMENT
As per  barney and patient, work up around 6AM today LKN, started noticing left side weakness around 11 AM to 12 pm today.

## 2023-08-30 NOTE — PATIENT PROFILE ADULT - FALL HARM RISK - HARM RISK INTERVENTIONS

## 2023-08-30 NOTE — H&P ADULT - HISTORY OF PRESENT ILLNESS
53-year-old female with PMH of hypothyroid, RA, asthma, and anxiety presented with left-sided weakness and numbness involving face, arms and legs, started at around 11 am this morning, last known well was at 6am. She also complains of dizziness, No headache, blurring of vision, slurring of speech, LOC, chest pain, SOB, N/V, abd pain, change in bowel or bladder. NIHSS at the time of examination is 1. CT showed no signs of ischemic changes, no hemorrhage, no space occupying  lesion.  In ED, her vitals  HR 54bpm     / 77    RR  14/min     Temp 36.7      Spo2 97%    NIHSS 3

## 2023-08-30 NOTE — H&P ADULT - ASSESSMENT
53-year-old female with PMH of hypothyroid, RA, asthma, and anxiety presented with left-sided weakness and numbness. Admitted for management of  stroke

## 2023-08-30 NOTE — H&P ADULT - NSHPREVIEWOFSYSTEMS_GEN_ALL_CORE
- CONSTITUTIONAL: Denies fever and chills  - HEENT: Denies changes in vision and hearing.  - RESPIRATORY: Denies SOB and cough.  - CV: Denies chest pain and palpitations  - GI: Denies abdominal pain, nausea, vomiting and diarrhea.  - : Denies dysuria and urinary frequency.  - SKIN: Denies rash and pruritus.  - NEUROLOGICAL: Denies headache and syncope.  - PSYCHIATRIC: Denies recent changes in mood.

## 2023-08-31 ENCOUNTER — TRANSCRIPTION ENCOUNTER (OUTPATIENT)
Age: 53
End: 2023-08-31

## 2023-08-31 VITALS
OXYGEN SATURATION: 96 % | TEMPERATURE: 98 F | DIASTOLIC BLOOD PRESSURE: 50 MMHG | RESPIRATION RATE: 18 BRPM | HEART RATE: 68 BPM | SYSTOLIC BLOOD PRESSURE: 97 MMHG

## 2023-08-31 LAB
A1C WITH ESTIMATED AVERAGE GLUCOSE RESULT: 5.4 % — SIGNIFICANT CHANGE UP (ref 4–5.6)
ALBUMIN SERPL ELPH-MCNC: 3.1 G/DL — LOW (ref 3.5–5)
ALP SERPL-CCNC: 85 U/L — SIGNIFICANT CHANGE UP (ref 40–120)
ALT FLD-CCNC: 13 U/L DA — SIGNIFICANT CHANGE UP (ref 10–60)
ANION GAP SERPL CALC-SCNC: 7 MMOL/L — SIGNIFICANT CHANGE UP (ref 5–17)
AST SERPL-CCNC: 8 U/L — LOW (ref 10–40)
BASOPHILS # BLD AUTO: 0.06 K/UL — SIGNIFICANT CHANGE UP (ref 0–0.2)
BASOPHILS NFR BLD AUTO: 1 % — SIGNIFICANT CHANGE UP (ref 0–2)
BILIRUB SERPL-MCNC: 0.4 MG/DL — SIGNIFICANT CHANGE UP (ref 0.2–1.2)
BUN SERPL-MCNC: 13 MG/DL — SIGNIFICANT CHANGE UP (ref 7–18)
CALCIUM SERPL-MCNC: 9.1 MG/DL — SIGNIFICANT CHANGE UP (ref 8.4–10.5)
CHLORIDE SERPL-SCNC: 111 MMOL/L — HIGH (ref 96–108)
CHOLEST SERPL-MCNC: 188 MG/DL — SIGNIFICANT CHANGE UP
CO2 SERPL-SCNC: 26 MMOL/L — SIGNIFICANT CHANGE UP (ref 22–31)
CREAT SERPL-MCNC: 0.87 MG/DL — SIGNIFICANT CHANGE UP (ref 0.5–1.3)
EGFR: 79 ML/MIN/1.73M2 — SIGNIFICANT CHANGE UP
EOSINOPHIL # BLD AUTO: 0.55 K/UL — HIGH (ref 0–0.5)
EOSINOPHIL NFR BLD AUTO: 9.3 % — HIGH (ref 0–6)
ESTIMATED AVERAGE GLUCOSE: 108 MG/DL — SIGNIFICANT CHANGE UP (ref 68–114)
GLUCOSE SERPL-MCNC: 93 MG/DL — SIGNIFICANT CHANGE UP (ref 70–99)
HCT VFR BLD CALC: 37.1 % — SIGNIFICANT CHANGE UP (ref 34.5–45)
HDLC SERPL-MCNC: 46 MG/DL — LOW
HGB BLD-MCNC: 12.4 G/DL — SIGNIFICANT CHANGE UP (ref 11.5–15.5)
IMM GRANULOCYTES NFR BLD AUTO: 0.5 % — SIGNIFICANT CHANGE UP (ref 0–0.9)
LIPID PNL WITH DIRECT LDL SERPL: 120 MG/DL — HIGH
LYMPHOCYTES # BLD AUTO: 1.85 K/UL — SIGNIFICANT CHANGE UP (ref 1–3.3)
LYMPHOCYTES # BLD AUTO: 31.4 % — SIGNIFICANT CHANGE UP (ref 13–44)
MAGNESIUM SERPL-MCNC: 2.2 MG/DL — SIGNIFICANT CHANGE UP (ref 1.6–2.6)
MCHC RBC-ENTMCNC: 31.1 PG — SIGNIFICANT CHANGE UP (ref 27–34)
MCHC RBC-ENTMCNC: 33.4 GM/DL — SIGNIFICANT CHANGE UP (ref 32–36)
MCV RBC AUTO: 93 FL — SIGNIFICANT CHANGE UP (ref 80–100)
MONOCYTES # BLD AUTO: 0.58 K/UL — SIGNIFICANT CHANGE UP (ref 0–0.9)
MONOCYTES NFR BLD AUTO: 9.8 % — SIGNIFICANT CHANGE UP (ref 2–14)
NEUTROPHILS # BLD AUTO: 2.82 K/UL — SIGNIFICANT CHANGE UP (ref 1.8–7.4)
NEUTROPHILS NFR BLD AUTO: 48 % — SIGNIFICANT CHANGE UP (ref 43–77)
NON HDL CHOLESTEROL: 142 MG/DL — HIGH
NRBC # BLD: 0 /100 WBCS — SIGNIFICANT CHANGE UP (ref 0–0)
PHOSPHATE SERPL-MCNC: 4.5 MG/DL — SIGNIFICANT CHANGE UP (ref 2.5–4.5)
PLATELET # BLD AUTO: 204 K/UL — SIGNIFICANT CHANGE UP (ref 150–400)
POTASSIUM SERPL-MCNC: 3.9 MMOL/L — SIGNIFICANT CHANGE UP (ref 3.5–5.3)
POTASSIUM SERPL-SCNC: 3.9 MMOL/L — SIGNIFICANT CHANGE UP (ref 3.5–5.3)
PROT SERPL-MCNC: 6.6 G/DL — SIGNIFICANT CHANGE UP (ref 6–8.3)
RBC # BLD: 3.99 M/UL — SIGNIFICANT CHANGE UP (ref 3.8–5.2)
RBC # FLD: 11.8 % — SIGNIFICANT CHANGE UP (ref 10.3–14.5)
SODIUM SERPL-SCNC: 144 MMOL/L — SIGNIFICANT CHANGE UP (ref 135–145)
TRIGL SERPL-MCNC: 108 MG/DL — SIGNIFICANT CHANGE UP
WBC # BLD: 5.89 K/UL — SIGNIFICANT CHANGE UP (ref 3.8–10.5)
WBC # FLD AUTO: 5.89 K/UL — SIGNIFICANT CHANGE UP (ref 3.8–10.5)

## 2023-08-31 PROCEDURE — 85730 THROMBOPLASTIN TIME PARTIAL: CPT

## 2023-08-31 PROCEDURE — 99285 EMERGENCY DEPT VISIT HI MDM: CPT | Mod: 25

## 2023-08-31 PROCEDURE — 85025 COMPLETE CBC W/AUTO DIFF WBC: CPT

## 2023-08-31 PROCEDURE — 83735 ASSAY OF MAGNESIUM: CPT

## 2023-08-31 PROCEDURE — 83036 HEMOGLOBIN GLYCOSYLATED A1C: CPT

## 2023-08-31 PROCEDURE — 82962 GLUCOSE BLOOD TEST: CPT

## 2023-08-31 PROCEDURE — 80053 COMPREHEN METABOLIC PANEL: CPT

## 2023-08-31 PROCEDURE — 84702 CHORIONIC GONADOTROPIN TEST: CPT

## 2023-08-31 PROCEDURE — 0042T: CPT | Mod: MA

## 2023-08-31 PROCEDURE — 85610 PROTHROMBIN TIME: CPT

## 2023-08-31 PROCEDURE — 70450 CT HEAD/BRAIN W/O DYE: CPT | Mod: MA

## 2023-08-31 PROCEDURE — 99232 SBSQ HOSP IP/OBS MODERATE 35: CPT

## 2023-08-31 PROCEDURE — 36415 COLL VENOUS BLD VENIPUNCTURE: CPT

## 2023-08-31 PROCEDURE — 84100 ASSAY OF PHOSPHORUS: CPT

## 2023-08-31 PROCEDURE — 70496 CT ANGIOGRAPHY HEAD: CPT | Mod: MA

## 2023-08-31 PROCEDURE — 80061 LIPID PANEL: CPT

## 2023-08-31 PROCEDURE — 93005 ELECTROCARDIOGRAM TRACING: CPT

## 2023-08-31 PROCEDURE — 93306 TTE W/DOPPLER COMPLETE: CPT | Mod: 26

## 2023-08-31 PROCEDURE — 93306 TTE W/DOPPLER COMPLETE: CPT

## 2023-08-31 PROCEDURE — 84484 ASSAY OF TROPONIN QUANT: CPT

## 2023-08-31 PROCEDURE — 70498 CT ANGIOGRAPHY NECK: CPT | Mod: MA

## 2023-08-31 RX ORDER — ATORVASTATIN CALCIUM 80 MG/1
1 TABLET, FILM COATED ORAL
Qty: 30 | Refills: 0
Start: 2023-08-31 | End: 2023-09-29

## 2023-08-31 RX ORDER — ASPIRIN/CALCIUM CARB/MAGNESIUM 324 MG
1 TABLET ORAL
Qty: 30 | Refills: 0
Start: 2023-08-31 | End: 2023-09-29

## 2023-08-31 RX ORDER — CLOPIDOGREL BISULFATE 75 MG/1
1 TABLET, FILM COATED ORAL
Qty: 21 | Refills: 0
Start: 2023-08-31 | End: 2023-09-20

## 2023-08-31 RX ADMIN — Medication 1 MILLIGRAM(S): at 12:44

## 2023-08-31 RX ADMIN — Medication 100 MICROGRAM(S): at 05:41

## 2023-08-31 RX ADMIN — Medication 325 MILLIGRAM(S): at 12:44

## 2023-08-31 RX ADMIN — CLOPIDOGREL BISULFATE 75 MILLIGRAM(S): 75 TABLET, FILM COATED ORAL at 12:44

## 2023-08-31 RX ADMIN — Medication 0.5 MILLIGRAM(S): at 12:44

## 2023-08-31 RX ADMIN — Medication 81 MILLIGRAM(S): at 12:44

## 2023-08-31 RX ADMIN — PANTOPRAZOLE SODIUM 40 MILLIGRAM(S): 20 TABLET, DELAYED RELEASE ORAL at 05:41

## 2023-08-31 NOTE — DISCHARGE NOTE PROVIDER - CARE PROVIDER_API CALL
Nguyen Samson (MD)  Clinical Neurophysiology; Neurology; Sleep Medicine  [address], [address]nd [place] [address]  Phone: [phone]  Fax: [phone]  Follow Up Time:    Nguyen Samson)  Clinical Neurophysiology; Neurology; Sleep Medicine  [address] [facility], [address]nd [place] [address]  Phone: [phone]  Fax: [phone]  Follow Up Time:     Chico Mccabe  Cardiovascular Disease  [address] [facility], [address]  [place] [address]  Phone: [phone]  Fax: [phone]  Follow Up Time:

## 2023-08-31 NOTE — DISCHARGE NOTE PROVIDER - CARE PROVIDERS DIRECT ADDRESSES
[email or web address]

## 2023-08-31 NOTE — DISCHARGE NOTE PROVIDER - NSDCCPCAREPLAN_GEN_ALL_CORE_FT
PRINCIPAL DISCHARGE DIAGNOSIS  Diagnosis: Brain TIA  Assessment and Plan of Treatment: You were diagnosed with transient ischemic attack (TIA) which means your brain had a temporary loss of oxygen perfusion but resolved on its own without any permanent brain damage. TIAs may present with transient neurologic symptoms such as facial droop, extremity weakness and difficulty speaking.  TIAs are often precursors to strokes and could serve as warning to prevent future stroke occurence.  CT head and MRI brain were unremarkable for any acute events.  An echo ws doen to check if it ws the cause f your symptoms and showed  normal ejection fraction but showed an PFO which is an abnormal communication between the chambers of the heart. You were seen by Neurology who recommends ASPIRIN 81 MG DAILY, ATORVASTATIN 80 MG DAILY AND PLAVIX 75 MG DAILY FOR A TOTAL OF 21, LAST DOSE [id number]. Please take medications as prescribed and follow up with your PCP/ Neurologist in a week from discharge.        SECONDARY DISCHARGE DIAGNOSES  Diagnosis: PFO (patent foramen ovale)  Assessment and Plan of Treatment: You presented to the ED with weakness on one side of your body.  CT head and MRI brain were unremarkable for any acute events.  An echo was done to check if it was the cause of your symptoms and showed  normal ejection fraction but showed an PFO which is an abnormal communication between the chambers of the heart. You are advised to follow up with cardiologist outpatient for further follow up.     PRINCIPAL DISCHARGE DIAGNOSIS  Diagnosis: Brain TIA  Assessment and Plan of Treatment: You were diagnosed with transient ischemic attack (TIA) which means your brain had a temporary loss of oxygen perfusion but resolved on its own without any permanent brain damage. TIAs may present with transient neurologic symptoms such as facial droop, extremity weakness and difficulty speaking.  TIAs are often precursors to strokes and could serve as warning to prevent future stroke occurence.  CT head was unremarkable for any acute events.  An echo was done to check if there was any cardiac related cause for your symptoms. ECHO showed normal ejection fraction but showed a PFO which is an abnormal communication between the chambers of the heart. You were seen by Neurology who recommends ASPIRIN 81 MG DAILY, ATORVASTATIN 80 MG DAILY AND PLAVIX 75 MG DAILY.  Please take medications as prescribed and follow up with your PCP and Neurologist Dr. Samson in a week from discharge.        SECONDARY DISCHARGE DIAGNOSES  Diagnosis: PFO (patent foramen ovale)  Assessment and Plan of Treatment: You presented to the ED with weakness on one side of your body.  CT head was unremarkable for any acute events.  An echo was done to check if it was the cause of your symptoms and showed  normal ejection fraction but showed a PFO which is an abnormal communication between the chambers of the heart. You are advised to follow up with cardiologist Dr. Lindsay outpatient for further follow up.

## 2023-08-31 NOTE — PROGRESS NOTE ADULT - ASSESSMENT
53-year-old female with PMH of hypothyroid, RA, asthma, and anxiety presented with left-sided weakness and numbness. The episode lasted for around 3 hrs and then subsided. At the ED patient had a NIHSS of 3, CT head was negative. Neuro consulted. Pending echo Admitted for management of  stroke

## 2023-08-31 NOTE — PROGRESS NOTE ADULT - PROBLEM SELECTOR PLAN 1
-CT head showed no signs of ischemia, hemorrhage, mass  -on aspirin and plavix  -F/u Echo w/ bubble study  -Neurology consulted, Dr Craven

## 2023-08-31 NOTE — DISCHARGE NOTE NURSING/CASE MANAGEMENT/SOCIAL WORK - PATIENT PORTAL LINK FT
You can access the FollowMyHealth Patient Portal offered by [facility] by registering at the following website: [email or web address]. By joining [employer]’s FollowMyHealth portal, you will also be able to view your health information using other applications (apps) compatible with our system.

## 2023-08-31 NOTE — DISCHARGE NOTE PROVIDER - HOSPITAL COURSE
53-year-old female with PMH of hypothyroid, RA, asthma, and anxiety presented with left-sided weakness and numbness involving face, arms and legs, started at around 11 am this morning, last known well was at 6am. She also complained of dizziness, No headache, blurring of vision, slurring of speech, LOC, chest pain, SOB, N/V, abd pain, change in bowel or bladder. NIHSS at the time of examination is 1. CT showed no signs of ischemic changes, no hemorrhage, no space occupying  lesion.  In ED, her vitals  were stable    She was admitted to rule out ACS and stroke. 53-year-old female with PMH of hypothyroid, RA, asthma, and anxiety presented with left-sided weakness and numbness involving face, arms and legs, started at around 11 am this morning, last known well was at 6am. She also complained of dizziness, No headache, blurring of vision, slurring of speech, LOC, chest pain, SOB, N/V, abd pain, change in bowel or bladder. NIHSS at the time of examination is 1. CT showed no signs of ischemic changes, no hemorrhage, no space occupying  lesion. She was admitted to rule out ACS and stroke.    Echo was done which showed normal EF with PFO.    Throughout hospital stay patient symptoms resolved and patient ws advised to follow up with PCP, neurologist and Cardiologist. 53-year-old female with PMH of hypothyroid, RA, asthma, and anxiety presented with left-sided weakness and numbness involving face, arms and legs, started at around 11 am this morning, last known well was at 6am. She also complained of dizziness, No headache, blurring of vision, slurring of speech, LOC, chest pain, SOB, N/V, abd pain, change in bowel or bladder. NIHSS at the time of examination is 1. CT showed no signs of ischemic changes, no hemorrhage, no space occupying  lesion. She was admitted for TIA work up.   Echo was done which showed normal EF with PFO.  Symptoms completely resolved and no events were recorded on telemetry.  On discharge, NIHSS score 0.    Patient will be discharged on asa, plavix, statin with close follow up with Dr. Samson for outpatient MRI.  Patient will follow up with Dr. Mccabe, Cardiology to assess if she is a candidate for PFO closure.

## 2023-08-31 NOTE — DISCHARGE NOTE PROVIDER - PROVIDER TOKENS
PROVIDER:[TOKEN:[[id number]]] PROVIDER:[TOKEN:[[id number]]],PROVIDER:[TOKEN:[[id number]]]

## 2023-08-31 NOTE — PROGRESS NOTE ADULT - ATTENDING COMMENTS
53F PMH hypothyroid, RA, asthma, anxiety, p/w cc LUE and LLE weakness since 6AM and L facial numbness now resolved. Admission for TIA.    AP  TIA  Hypothyroid  RA  Asthma  Anxiety    -NIHSS = 3  -L sided weakness 9h prior to presentation, now resolved  -CTH and CTA reviewed, no significant findings  -rope score 6-7, obtain TTE with bubble study  -monitor on tele  -start DAPT, statin  -PPI for gi ppx  -no dysphagia, start diet  -resume home meds  -dvt ppx .    dispo: after TTE if no PFO, can dc home with neurology follow up and MRI

## 2023-08-31 NOTE — DISCHARGE NOTE PROVIDER - ATTENDING DISCHARGE PHYSICAL EXAMINATION:
Gen: NAD  Neuro: alert, answering qs appropriately, moves all extremities  HEENT: anicteric, moist oral mucosa  Neck: supple, no JVD elevation  Cards: RRR  Pulm: good inspiratory effort, CTAB  Abd: soft, NT/ND, BS+  Ext: no edema  Skin: warm, dry    NIHSS 0

## 2023-08-31 NOTE — DISCHARGE NOTE PROVIDER - NSDCMRMEDTOKEN_GEN_ALL_CORE_FT
Allegra 180 mg oral tablet: 1 tab(s) orally once a day  Amitiza 8 mcg oral capsule: 1 cap(s) orally 2 times a day, As Needed  cyclobenzaprine 5 mg oral tablet: 1 tab(s) orally every 8 hours, As Needed muscle spasm   ferrous sulfate 325 mg (65 mg elemental iron) oral delayed release tablet: 1 tab(s) orally once a day  folic acid 1 mg oral tablet: 1 tab(s) orally once a day  KlonoPIN 0.5 mg oral tablet: 1 tab(s) orally once a day  methotrexate 2.5 mg oral tablet: 1 tab(s) orally once a week  pantoprazole 40 mg oral delayed release tablet: 1 tab(s) orally once a day  predniSONE 1 mg oral delayed release tablet: 1 tab(s) orally 3 times a day  Synthroid 100 mcg (0.1 mg) oral tablet: 1 tab(s) orally once a day   Adult Aspirin Regimen 81 mg oral delayed release tablet: 1 tab(s) orally once a day  Allegra 180 mg oral tablet: 1 tab(s) orally once a day  Amitiza 8 mcg oral capsule: 1 cap(s) orally 2 times a day, As Needed  atorvastatin 80 mg oral tablet: 1 tab(s) orally once a day (at bedtime)  cyclobenzaprine 5 mg oral tablet: 1 tab(s) orally every 8 hours, As Needed muscle spasm   ferrous sulfate 325 mg (65 mg elemental iron) oral delayed release tablet: 1 tab(s) orally once a day  folic acid 1 mg oral tablet: 1 tab(s) orally once a day  KlonoPIN 0.5 mg oral tablet: 1 tab(s) orally once a day  methotrexate 2.5 mg oral tablet: 1 tab(s) orally once a week  pantoprazole 40 mg oral delayed release tablet: 1 tab(s) orally once a day  Plavix 75 mg oral tablet: 1 tab(s) orally once a day  predniSONE 1 mg oral delayed release tablet: 1 tab(s) orally 3 times a day  Synthroid 100 mcg (0.1 mg) oral tablet: 1 tab(s) orally once a day

## 2023-08-31 NOTE — DISCHARGE NOTE NURSING/CASE MANAGEMENT/SOCIAL WORK - NSDCPNINST_GEN_ALL_CORE
Follow up if any worsening weakness, numbness on one side of the body noted. Call MD or go to ED. Follow up with neurologist as advised.

## 2023-08-31 NOTE — PROGRESS NOTE ADULT - SUBJECTIVE AND OBJECTIVE BOX
PGY-1 Progress Note discussed with attending    PAGER #: [[phone]] TILL 5:00 PM  PLEASE CONTACT ON CALL TEAM:  - On Call Team (Please refer to [place]) FROM 5:00 PM - 8:30PM  - Nightfloat Team FROM 8:30 -7:30 AM    INTERVAL HPI/OVERNIGHT EVENTS: Patient was seen at bedside, patient states that episode yesterday involved weakness in the left side including arms and legs sparring the face. Patient states that this has not happened before. At moment of evaluation patient symptoms have resolved. Denies any weakness, chest pain or tingling sensation.       REVIEW OF SYSTEMS:  CONSTITUTIONAL: No fever, weight loss, or fatigue  RESPIRATORY: No cough, wheezing, chills or hemoptysis; No shortness of breath  CARDIOVASCULAR: No chest pain, palpitations, dizziness, or leg swelling  GASTROINTESTINAL: No abdominal pain. No nausea, vomiting, or hematemesis; No diarrhea or constipation. No melena or hematochezia.  GENITOURINARY: No dysuria or hematuria, urinary frequency  NEUROLOGICAL: No headaches, memory loss, loss of strength, numbness, or tremors  SKIN: No itching, burning, rashes, or lesions     Vital Signs Last 24 Hrs  T(C): 36.7 (31 Aug 2023 11:25), Max: 37.2 (30 Aug 2023 14:55)  T(F): 98.1 (31 Aug 2023 11:25), Max: 99 (30 Aug 2023 14:55)  HR: 67 (31 Aug 2023 11:25) (53 - 67)  BP: 95/71 (31 Aug 2023 11:25) (90/61 - 126/87)  BP(mean): --  RR: 18 (31 Aug 2023 11:25) (12 - 18)  SpO2: 96% (31 Aug 2023 11:25) (94% - 99%)    Parameters below as of 31 Aug 2023 11:25  Patient On (Oxygen Delivery Method): room air        PHYSICAL EXAMINATION:  GENERAL: NAD,  HEAD:  Atraumatic, Normocephalic  EYES:  conjunctiva and sclera clear  NECK: Supple,  CHEST/LUNG: Clear to auscultation. Clear to percussion bilaterally; No rales, rhonchi, wheezing, or rubs  HEART: Regular rate and rhythm; No murmurs, rubs, or gallops  ABDOMEN: Soft, Nontender, Nondistended; Bowel sounds present  NERVOUS SYSTEM:  Alert & Oriented X3,    EXTREMITIES:  2+ Peripheral Pulses, No clubbing, cyanosis, or edema  SKIN: warm dry                          12.4   5.89  )-----------( 204      ( 31 Aug 2023 06:05 )             37.1     08-31    144  |  111<H>  |  13  ----------------------------<  93  3.9   |  26  |  0.87    Ca    9.1      31 Aug 2023 06:05  Phos  4.5     08-31  Mg     2.2     08-31    TPro  6.6  /  Alb  3.1<L>  /  TBili  0.4  /  DBili  x   /  AST  8<L>  /  ALT  13  /  AlkPhos  85  08-31    LIVER FUNCTIONS - ( 31 Aug 2023 06:05 )  Alb: 3.1 g/dL / Pro: 6.6 g/dL / ALK PHOS: 85 U/L / ALT: 13 U/L DA / AST: 8 U/L / GGT: x               PT/INR - ( 30 Aug 2023 15:00 )   PT: 11.8 sec;   INR: 1.04 ratio         PTT - ( 30 Aug 2023 15:00 )  PTT:32.7 sec    CAPILLARY BLOOD GLUCOSE      RADIOLOGY & ADDITIONAL TESTS:

## 2023-08-31 NOTE — DISCHARGE NOTE NURSING/CASE MANAGEMENT/SOCIAL WORK - NSDCPEFALRISK_GEN_ALL_CORE
For information on Fall & Injury Prevention, visit: [email or web address] OR  [email or web address] OR  https://www.cdc.gov/steadi/patient.html

## 2023-09-02 ENCOUNTER — EMERGENCY (EMERGENCY)
Facility: HOSPITAL | Age: 53
LOS: 1 days | Discharge: HOME | End: 2023-09-02
Attending: STUDENT IN AN ORGANIZED HEALTH CARE EDUCATION/TRAINING PROGRAM
Payer: MEDICAID

## 2023-09-02 VITALS
DIASTOLIC BLOOD PRESSURE: 73 MMHG | SYSTOLIC BLOOD PRESSURE: 103 MMHG | HEART RATE: 81 BPM | HEIGHT: 65 IN | TEMPERATURE: 98 F | OXYGEN SATURATION: 96 % | RESPIRATION RATE: 16 BRPM | WEIGHT: 151.9 LBS

## 2023-09-02 DIAGNOSIS — Z98.89 OTHER SPECIFIED POSTPROCEDURAL STATES: Chronic | ICD-10-CM

## 2023-09-02 LAB
ALBUMIN SERPL ELPH-MCNC: 3.4 G/DL — LOW (ref 3.5–5)
ALP SERPL-CCNC: 89 U/L — SIGNIFICANT CHANGE UP (ref 40–120)
ALT FLD-CCNC: 16 U/L DA — SIGNIFICANT CHANGE UP (ref 10–60)
ANION GAP SERPL CALC-SCNC: 1 MMOL/L — LOW (ref 5–17)
AST SERPL-CCNC: 14 U/L — SIGNIFICANT CHANGE UP (ref 10–40)
BASOPHILS # BLD AUTO: 0.07 K/UL — SIGNIFICANT CHANGE UP (ref 0–0.2)
BASOPHILS NFR BLD AUTO: 1 % — SIGNIFICANT CHANGE UP (ref 0–2)
BILIRUB SERPL-MCNC: 0.3 MG/DL — SIGNIFICANT CHANGE UP (ref 0.2–1.2)
BUN SERPL-MCNC: 13 MG/DL — SIGNIFICANT CHANGE UP (ref 7–18)
CALCIUM SERPL-MCNC: 8.7 MG/DL — SIGNIFICANT CHANGE UP (ref 8.4–10.5)
CHLORIDE SERPL-SCNC: 111 MMOL/L — HIGH (ref 96–108)
CO2 SERPL-SCNC: 27 MMOL/L — SIGNIFICANT CHANGE UP (ref 22–31)
CREAT SERPL-MCNC: 1.01 MG/DL — SIGNIFICANT CHANGE UP (ref 0.5–1.3)
EGFR: 66 ML/MIN/1.73M2 — SIGNIFICANT CHANGE UP
EOSINOPHIL # BLD AUTO: 0.44 K/UL — SIGNIFICANT CHANGE UP (ref 0–0.5)
EOSINOPHIL NFR BLD AUTO: 6 % — SIGNIFICANT CHANGE UP (ref 0–6)
GLUCOSE SERPL-MCNC: 113 MG/DL — HIGH (ref 70–99)
HCT VFR BLD CALC: 36.6 % — SIGNIFICANT CHANGE UP (ref 34.5–45)
HGB BLD-MCNC: 12.3 G/DL — SIGNIFICANT CHANGE UP (ref 11.5–15.5)
IMM GRANULOCYTES NFR BLD AUTO: 0.5 % — SIGNIFICANT CHANGE UP (ref 0–0.9)
LIDOCAIN IGE QN: 64 U/L — SIGNIFICANT CHANGE UP (ref 13–75)
LYMPHOCYTES # BLD AUTO: 2.11 K/UL — SIGNIFICANT CHANGE UP (ref 1–3.3)
LYMPHOCYTES # BLD AUTO: 29 % — SIGNIFICANT CHANGE UP (ref 13–44)
MAGNESIUM SERPL-MCNC: 1.9 MG/DL — SIGNIFICANT CHANGE UP (ref 1.6–2.6)
MCHC RBC-ENTMCNC: 31.4 PG — SIGNIFICANT CHANGE UP (ref 27–34)
MCHC RBC-ENTMCNC: 33.6 GM/DL — SIGNIFICANT CHANGE UP (ref 32–36)
MCV RBC AUTO: 93.4 FL — SIGNIFICANT CHANGE UP (ref 80–100)
MONOCYTES # BLD AUTO: 0.68 K/UL — SIGNIFICANT CHANGE UP (ref 0–0.9)
MONOCYTES NFR BLD AUTO: 9.3 % — SIGNIFICANT CHANGE UP (ref 2–14)
NEUTROPHILS # BLD AUTO: 3.94 K/UL — SIGNIFICANT CHANGE UP (ref 1.8–7.4)
NEUTROPHILS NFR BLD AUTO: 54.2 % — SIGNIFICANT CHANGE UP (ref 43–77)
NRBC # BLD: 0 /100 WBCS — SIGNIFICANT CHANGE UP (ref 0–0)
PLATELET # BLD AUTO: 211 K/UL — SIGNIFICANT CHANGE UP (ref 150–400)
POTASSIUM SERPL-MCNC: 3.9 MMOL/L — SIGNIFICANT CHANGE UP (ref 3.5–5.3)
POTASSIUM SERPL-SCNC: 3.9 MMOL/L — SIGNIFICANT CHANGE UP (ref 3.5–5.3)
PROT SERPL-MCNC: 7.1 G/DL — SIGNIFICANT CHANGE UP (ref 6–8.3)
RBC # BLD: 3.92 M/UL — SIGNIFICANT CHANGE UP (ref 3.8–5.2)
RBC # FLD: 11.9 % — SIGNIFICANT CHANGE UP (ref 10.3–14.5)
SODIUM SERPL-SCNC: 139 MMOL/L — SIGNIFICANT CHANGE UP (ref 135–145)
TROPONIN I, HIGH SENSITIVITY RESULT: 3.7 NG/L — SIGNIFICANT CHANGE UP
WBC # BLD: 7.28 K/UL — SIGNIFICANT CHANGE UP (ref 3.8–10.5)
WBC # FLD AUTO: 7.28 K/UL — SIGNIFICANT CHANGE UP (ref 3.8–10.5)

## 2023-09-02 PROCEDURE — 93010 ELECTROCARDIOGRAM REPORT: CPT

## 2023-09-02 PROCEDURE — 82962 GLUCOSE BLOOD TEST: CPT

## 2023-09-02 PROCEDURE — 71045 X-RAY EXAM CHEST 1 VIEW: CPT

## 2023-09-02 PROCEDURE — 83690 ASSAY OF LIPASE: CPT

## 2023-09-02 PROCEDURE — 84484 ASSAY OF TROPONIN QUANT: CPT

## 2023-09-02 PROCEDURE — 99285 EMERGENCY DEPT VISIT HI MDM: CPT | Mod: 25

## 2023-09-02 PROCEDURE — 99285 EMERGENCY DEPT VISIT HI MDM: CPT

## 2023-09-02 PROCEDURE — 85025 COMPLETE CBC W/AUTO DIFF WBC: CPT

## 2023-09-02 PROCEDURE — 80053 COMPREHEN METABOLIC PANEL: CPT

## 2023-09-02 PROCEDURE — 71045 X-RAY EXAM CHEST 1 VIEW: CPT | Mod: 26

## 2023-09-02 PROCEDURE — 93005 ELECTROCARDIOGRAM TRACING: CPT

## 2023-09-02 PROCEDURE — 83735 ASSAY OF MAGNESIUM: CPT

## 2023-09-02 PROCEDURE — 36415 COLL VENOUS BLD VENIPUNCTURE: CPT

## 2023-09-02 NOTE — ED ADULT NURSE NOTE - CAS EDN DISCHARGE ASSESSMENT
----- Message from [place] sent at 12/18/2018  2:22 PM EST -----  Regarding: [place]/telephone  Pt stated [facility] needs the mg to the new medication for COPD. She stated the pharmacy faxed a request a few times and she does not know the name of it. Pts number [place] [phone] or cell [phone].
Patients  AG on hippa called back to return your phone call and I told him I would leave a message for you to call him back. Call back number is [phone]. Thanks.
RC to . See previous encounter.
Alert and oriented to person, place and time

## 2023-09-02 NOTE — ED PROVIDER NOTE - PHYSICAL EXAMINATION
General: well appearing female, no acute distress   HEENT: normocephalic, atraumatic   Respiratory: normal work of breathing  Cardiac: regular rate and rhythm   MSK: no swelling or tenderness of lower extremities, moving all extremities spontaneously   Skin: warm, dry   Neuro: A&Ox3  Psych: appropriate affect

## 2023-09-02 NOTE — ED PROVIDER NOTE - CLINICAL SUMMARY MEDICAL DECISION MAKING FREE TEXT BOX
53F presenting with chest pain and facial swelling. chest pain not exertional or pleuritic. face does not appear swollen on exam. will get labs to assess for ACS.

## 2023-09-02 NOTE — ED PROVIDER NOTE - PATIENT PORTAL LINK FT
You can access the FollowMyHealth Patient Portal offered by [facility] by registering at the following website: [email or web address]. By joining [employer]’s FollowMyHealth portal, you will also be able to view your health information using other applications (apps) compatible with our system.

## 2023-09-02 NOTE — ED PROVIDER NOTE - NSFOLLOWUPINSTRUCTIONS_ED_ALL_ED_FT
You were seen in the emergency department for chest pain.    Please follow-up with your primary care doctor within the next 48 hours.     If you have any worsening symptoms, headache, dizziness, severe chest pain, trouble breathing, please return to the emergency department.

## 2023-09-02 NOTE — ED PROVIDER NOTE - OBJECTIVE STATEMENT
53F, pmh of hypothyroid, RA, asthma, and anxiety, presenting with chest pain and facial swelling. patient was recently discharged after being treated for a "mini stroke". started taking Atorvastatin and is concered it is making the left side of her face swell. also has chest pain but is unsure if it is muscle pain.

## 2024-11-30 ENCOUNTER — EMERGENCY (EMERGENCY)
Facility: HOSPITAL | Age: 54
LOS: 1 days | Discharge: HOME | End: 2024-11-30
Attending: STUDENT IN AN ORGANIZED HEALTH CARE EDUCATION/TRAINING PROGRAM | Admitting: EMERGENCY MEDICINE
Payer: MEDICAID

## 2024-11-30 VITALS
OXYGEN SATURATION: 96 % | HEART RATE: 67 BPM | WEIGHT: 160.06 LBS | DIASTOLIC BLOOD PRESSURE: 64 MMHG | HEIGHT: 65 IN | RESPIRATION RATE: 16 BRPM | TEMPERATURE: 98 F | SYSTOLIC BLOOD PRESSURE: 111 MMHG

## 2024-11-30 DIAGNOSIS — Z98.89 OTHER SPECIFIED POSTPROCEDURAL STATES: Chronic | ICD-10-CM

## 2024-11-30 LAB
ALBUMIN SERPL ELPH-MCNC: 4 G/DL — SIGNIFICANT CHANGE UP (ref 3.3–5)
ALP SERPL-CCNC: 88 U/L — SIGNIFICANT CHANGE UP (ref 40–120)
ALT FLD-CCNC: 11 U/L — SIGNIFICANT CHANGE UP (ref 4–33)
ANION GAP SERPL CALC-SCNC: 14 MMOL/L — SIGNIFICANT CHANGE UP (ref 7–14)
AST SERPL-CCNC: 12 U/L — SIGNIFICANT CHANGE UP (ref 4–32)
BASOPHILS # BLD AUTO: 0.07 K/UL — SIGNIFICANT CHANGE UP (ref 0–0.2)
BASOPHILS NFR BLD AUTO: 1 % — SIGNIFICANT CHANGE UP (ref 0–2)
BILIRUB SERPL-MCNC: 0.2 MG/DL — SIGNIFICANT CHANGE UP (ref 0.2–1.2)
BLOOD GAS VENOUS COMPREHENSIVE RESULT: SIGNIFICANT CHANGE UP
BUN SERPL-MCNC: 14 MG/DL — SIGNIFICANT CHANGE UP (ref 7–23)
CALCIUM SERPL-MCNC: 9.3 MG/DL — SIGNIFICANT CHANGE UP (ref 8.4–10.5)
CHLORIDE SERPL-SCNC: 103 MMOL/L — SIGNIFICANT CHANGE UP (ref 98–107)
CO2 SERPL-SCNC: 22 MMOL/L — SIGNIFICANT CHANGE UP (ref 22–31)
CREAT SERPL-MCNC: 0.71 MG/DL — SIGNIFICANT CHANGE UP (ref 0.5–1.3)
EGFR: 100 ML/MIN/1.73M2 — SIGNIFICANT CHANGE UP
EOSINOPHIL # BLD AUTO: 0.38 K/UL — SIGNIFICANT CHANGE UP (ref 0–0.5)
EOSINOPHIL NFR BLD AUTO: 5.6 % — SIGNIFICANT CHANGE UP (ref 0–6)
GLUCOSE SERPL-MCNC: 82 MG/DL — SIGNIFICANT CHANGE UP (ref 70–99)
HCT VFR BLD CALC: 33.8 % — LOW (ref 34.5–45)
HGB BLD-MCNC: 11.6 G/DL — SIGNIFICANT CHANGE UP (ref 11.5–15.5)
IANC: 3.4 K/UL — SIGNIFICANT CHANGE UP (ref 1.8–7.4)
IMM GRANULOCYTES NFR BLD AUTO: 0.3 % — SIGNIFICANT CHANGE UP (ref 0–0.9)
LIDOCAIN IGE QN: 45 U/L — SIGNIFICANT CHANGE UP (ref 7–60)
LYMPHOCYTES # BLD AUTO: 2.34 K/UL — SIGNIFICANT CHANGE UP (ref 1–3.3)
LYMPHOCYTES # BLD AUTO: 34.7 % — SIGNIFICANT CHANGE UP (ref 13–44)
MCHC RBC-ENTMCNC: 32 PG — SIGNIFICANT CHANGE UP (ref 27–34)
MCHC RBC-ENTMCNC: 34.3 G/DL — SIGNIFICANT CHANGE UP (ref 32–36)
MCV RBC AUTO: 93.1 FL — SIGNIFICANT CHANGE UP (ref 80–100)
MONOCYTES # BLD AUTO: 0.53 K/UL — SIGNIFICANT CHANGE UP (ref 0–0.9)
MONOCYTES NFR BLD AUTO: 7.9 % — SIGNIFICANT CHANGE UP (ref 2–14)
NEUTROPHILS # BLD AUTO: 3.4 K/UL — SIGNIFICANT CHANGE UP (ref 1.8–7.4)
NEUTROPHILS NFR BLD AUTO: 50.5 % — SIGNIFICANT CHANGE UP (ref 43–77)
NRBC # BLD: 0 /100 WBCS — SIGNIFICANT CHANGE UP (ref 0–0)
NRBC # FLD: 0 K/UL — SIGNIFICANT CHANGE UP (ref 0–0)
PLATELET # BLD AUTO: 230 K/UL — SIGNIFICANT CHANGE UP (ref 150–400)
POTASSIUM SERPL-MCNC: 3.8 MMOL/L — SIGNIFICANT CHANGE UP (ref 3.5–5.3)
POTASSIUM SERPL-SCNC: 3.8 MMOL/L — SIGNIFICANT CHANGE UP (ref 3.5–5.3)
PROT SERPL-MCNC: 7.1 G/DL — SIGNIFICANT CHANGE UP (ref 6–8.3)
RBC # BLD: 3.63 M/UL — LOW (ref 3.8–5.2)
RBC # FLD: 11.6 % — SIGNIFICANT CHANGE UP (ref 10.3–14.5)
SODIUM SERPL-SCNC: 139 MMOL/L — SIGNIFICANT CHANGE UP (ref 135–145)
WBC # BLD: 6.74 K/UL — SIGNIFICANT CHANGE UP (ref 3.8–10.5)
WBC # FLD AUTO: 6.74 K/UL — SIGNIFICANT CHANGE UP (ref 3.8–10.5)

## 2024-11-30 PROCEDURE — 71046 X-RAY EXAM CHEST 2 VIEWS: CPT | Mod: 26

## 2024-11-30 PROCEDURE — 99285 EMERGENCY DEPT VISIT HI MDM: CPT

## 2024-11-30 RX ORDER — HYDROCORTISONE ACETATE 25 MG/ML
200 VIAL (ML) INJECTION ONCE
Refills: 0 | Status: COMPLETED | OUTPATIENT
Start: 2024-11-30 | End: 2024-11-30

## 2024-11-30 RX ORDER — DIPHENHYDRAMINE HCL 25 MG
50 CAPSULE ORAL ONCE
Refills: 0 | Status: COMPLETED | OUTPATIENT
Start: 2024-11-30 | End: 2024-11-30

## 2024-11-30 RX ORDER — KETOROLAC TROMETHAMINE 30 MG/ML
15 INJECTION INTRAMUSCULAR; INTRAVENOUS ONCE
Refills: 0 | Status: DISCONTINUED | OUTPATIENT
Start: 2024-11-30 | End: 2024-11-30

## 2024-11-30 RX ORDER — SODIUM CHLORIDE 9 MG/ML
1000 INJECTION, SOLUTION INTRAMUSCULAR; INTRAVENOUS; SUBCUTANEOUS ONCE
Refills: 0 | Status: COMPLETED | OUTPATIENT
Start: 2024-11-30 | End: 2024-11-30

## 2024-11-30 RX ORDER — FAMOTIDINE 20 MG/1
20 TABLET, FILM COATED ORAL ONCE
Refills: 0 | Status: COMPLETED | OUTPATIENT
Start: 2024-11-30 | End: 2024-11-30

## 2024-11-30 RX ORDER — METOCLOPRAMIDE HYDROCHLORIDE 10 MG/1
10 TABLET ORAL ONCE
Refills: 0 | Status: COMPLETED | OUTPATIENT
Start: 2024-11-30 | End: 2024-11-30

## 2024-11-30 RX ORDER — MAGNESIUM, ALUMINUM HYDROXIDE 200-225/5
30 SUSPENSION, ORAL (FINAL DOSE FORM) ORAL ONCE
Refills: 0 | Status: COMPLETED | OUTPATIENT
Start: 2024-11-30 | End: 2024-11-30

## 2024-11-30 RX ADMIN — SODIUM CHLORIDE 1000 MILLILITER(S): 9 INJECTION, SOLUTION INTRAMUSCULAR; INTRAVENOUS; SUBCUTANEOUS at 22:15

## 2024-11-30 RX ADMIN — KETOROLAC TROMETHAMINE 15 MILLIGRAM(S): 30 INJECTION INTRAMUSCULAR; INTRAVENOUS at 22:15

## 2024-11-30 RX ADMIN — Medication 30 MILLILITER(S): at 22:15

## 2024-11-30 RX ADMIN — METOCLOPRAMIDE HYDROCHLORIDE 10 MILLIGRAM(S): 10 TABLET ORAL at 22:15

## 2024-11-30 RX ADMIN — Medication 200 MILLIGRAM(S): at 22:15

## 2024-11-30 RX ADMIN — FAMOTIDINE 20 MILLIGRAM(S): 20 TABLET, FILM COATED ORAL at 22:15

## 2024-11-30 NOTE — ED PROVIDER NOTE - CLINICAL SUMMARY MEDICAL DECISION MAKING FREE TEXT BOX
54-year-old past medical history of panic disorder on Klonopin, 2 C-sections rheumatoid arthritis on methotrexate, hypothyroid and Synthroid with worsening abdominal pain.  Says she feels gassy.  Has some discomfort in the left upper quadrant.  No diarrhea had a bowel movement yesterday, passing gas but is passing less gas than usual.  Patient did not take anything for the pain.  She also endorses nausea but no vomiting.  Of note patient has a contrast allergy she gets hives.  Patient's nontoxic but uncomfortable appearing.  She had tenderness to palpation left upper quadrant where she also had tympany to percussion.  She is neuro intact 5 out of 5 in all extremities ambulatory vital signs are reviewed and within normal limits.  Will obtain CT imaging to rule out SBO or other surgical pathology.  Patient will be signed out to oncoming attending pending premedication for contrast allergy and CT imaging

## 2024-11-30 NOTE — ED ADULT NURSE NOTE - OBJECTIVE STATEMENT
C/O abdominal pain and nausea. No complaints of chest pain, headache, nausea, dizziness, SOB, fever, chills verbalized. Respirations even and unlabored. ABD is soft, non tender, non distended. Phx panic DO. 20G IV placed to right forearm. labs ent. pt premedicated due to contrast allergy. benadryl to be administered at 3am. CT to be performed at 4am. as per dr. Menon. call robb within reach family and patient aware. will continue to monitor

## 2024-11-30 NOTE — ED ADULT NURSE NOTE - NSFALLUNIVINTERV_ED_ALL_ED
Bed/Stretcher in lowest position, wheels locked, appropriate side rails in place/Call bell, personal items and telephone in reach/Instruct patient to call for assistance before getting out of bed/chair/stretcher/Non-slip footwear applied when patient is off stretcher/[place] to call system/Physically safe environment - no spills, clutter or unnecessary equipment/Purposeful proactive rounding/Room/bathroom lighting operational, light cord in reach
Never smoker

## 2024-11-30 NOTE — ED ADULT TRIAGE NOTE - CHIEF COMPLAINT QUOTE
pt /co several months diffuse abdominal pain, pt evaluated by GI, MRI done. pt states pain worse today 8/10, nausea. past med hx depression, RA, asthma,

## 2024-11-30 NOTE — ED PROVIDER NOTE - PATIENT PORTAL LINK FT
You can access the FollowMyHealth Patient Portal offered by [facility] by registering at the following website: [email or web address]. By joining [employer]’s FollowMyHealth portal, you will also be able to view your health information using other applications (apps) compatible with our system.

## 2024-12-01 VITALS
SYSTOLIC BLOOD PRESSURE: 106 MMHG | OXYGEN SATURATION: 95 % | DIASTOLIC BLOOD PRESSURE: 60 MMHG | RESPIRATION RATE: 17 BRPM | TEMPERATURE: 98 F | HEART RATE: 59 BPM

## 2024-12-01 PROCEDURE — 74177 CT ABD & PELVIS W/CONTRAST: CPT | Mod: 26,MC

## 2024-12-01 RX ORDER — DIPHENHYDRAMINE HCL 25 MG
25 CAPSULE ORAL ONCE
Refills: 0 | Status: DISCONTINUED | OUTPATIENT
Start: 2024-12-01 | End: 2024-12-01

## 2024-12-01 RX ORDER — LORAZEPAM 2 MG/1
1 TABLET ORAL ONCE
Refills: 0 | Status: DISCONTINUED | OUTPATIENT
Start: 2024-12-01 | End: 2024-12-01

## 2024-12-01 RX ORDER — DIPHENHYDRAMINE HCL 25 MG
50 CAPSULE ORAL ONCE
Refills: 0 | Status: COMPLETED | OUTPATIENT
Start: 2024-12-01 | End: 2024-12-01

## 2024-12-01 RX ADMIN — LORAZEPAM 1 MILLIGRAM(S): 2 TABLET ORAL at 01:59

## 2024-12-01 RX ADMIN — Medication 50 MILLIGRAM(S): at 03:10

## 2025-03-20 ENCOUNTER — EMERGENCY (EMERGENCY)
Facility: HOSPITAL | Age: 55
LOS: 1 days | Discharge: HOME | End: 2025-03-20
Attending: EMERGENCY MEDICINE | Admitting: EMERGENCY MEDICINE
Payer: MEDICAID

## 2025-03-20 VITALS
DIASTOLIC BLOOD PRESSURE: 54 MMHG | OXYGEN SATURATION: 100 % | TEMPERATURE: 98 F | HEART RATE: 64 BPM | SYSTOLIC BLOOD PRESSURE: 95 MMHG | RESPIRATION RATE: 16 BRPM

## 2025-03-20 VITALS — SYSTOLIC BLOOD PRESSURE: 99 MMHG | DIASTOLIC BLOOD PRESSURE: 70 MMHG

## 2025-03-20 DIAGNOSIS — Z98.89 OTHER SPECIFIED POSTPROCEDURAL STATES: Chronic | ICD-10-CM

## 2025-03-20 PROCEDURE — 99284 EMERGENCY DEPT VISIT MOD MDM: CPT

## 2025-03-20 RX ORDER — FLUORESCEIN SODIUM 0.6 MG
1 STRIP OPHTHALMIC (EYE) ONCE
Refills: 0 | Status: COMPLETED | OUTPATIENT
Start: 2025-03-20 | End: 2025-03-20

## 2025-03-20 RX ADMIN — Medication 1 APPLICATION(S): at 12:43

## 2025-03-20 NOTE — ED PROVIDER NOTE - NSFOLLOWUPINSTRUCTIONS_ED_ALL_ED_FT
Today in the emergency department you were evaluated for redness and pain in your left eye.  This could be due to allergies or eye dryness.  Please use the allergy eyedrops provided to you.  Please continue take all of your other medications as prescribed.      Please follow-up with an ophthalmologist within 1 to 2 weeks of discharge from the emergency department.  Please follow up with your primary care physician within 1-2 weeks of discharge from the emergency department.  Please bring a copy of your results with you.  Please return to the emergency department for worsening of your symptoms.    You may take Acetaminophen over the counter as needed for pain and/or fever. Use as directed and see medication warnings.  You may take Ibuprofen over the counter as needed for pain and/or fever. Use as directed and see medication warnings.

## 2025-03-20 NOTE — ED PROVIDER NOTE - PHYSICAL EXAMINATION
GENERAL: well appearing  HEAD: normocephalic, atraumatic  HEENT: normal conjunctiva, oral mucosa moist, mild erythema to L conjunctiva  EYE: 20/40 R eye, 20/30 L eye, IOP 13R/12L, fluorescein exam without any corneal abrasions or ulcerations  CARDIAC: regular rate and rhythm  PULM: speaking in full sentences, no increased work of breathing  GI: abdomen nondistended, soft, nontender  : no suprapubic tenderness  NEURO: moving all 4 extremities, answering questions appropriately  MSK: no peripheral edema  SKIN: extremities warm

## 2025-03-20 NOTE — ED PROVIDER NOTE - CLINICAL SUMMARY MEDICAL DECISION MAKING FREE TEXT BOX
54-year-old female with a history of hypothyroidism on Synthroid, RA on methotrexate, GERD on pantoprazole, corneal ulcer (2021) presenting with pain, redness to her left eye.  Concern for corneal abrasion/ulceration.  Low likelihood, conjunctivitis or infectious processes I does not have any significant exudate, no sick contacts.  Fluorescein ordered.  Dispo pending eye exam. IOP WNL in both eyes, vision grossly normal in both eyes, fluorescein exam unremarkable.  Dispo to home with outpatient ophthalmology follow-up.

## 2025-03-20 NOTE — ED PROVIDER NOTE - OBJECTIVE STATEMENT
54-year-old female with a history of hypothyroidism on Synthroid, RA on methotrexate, GERD on pantoprazole, corneal ulcer (2021) presenting with pain, redness to her left eye.  Patient has not been to an ophthalmologist in several years.  She was concerned about the redness, pain to her left eye in the setting of her prior corneal ulcer presenting for further evaluation.  She otherwise has no significant headache, chest pain, shortness of breath, N/V/D/abdominal pain, dysuria, peripheral edema, fever/chills.  Allergic to amoxicillin.

## 2025-03-20 NOTE — ED PROVIDER NOTE - NSFOLLOWUPCLINICS_GEN_ALL_ED_FT
[facility]  Ophthalmology  [address] [place] [address] [address]  [place] [address]  Phone: [phone]  Fax:   Follow Up Time: 7-10 Days

## 2025-03-20 NOTE — ED ADULT TRIAGE NOTE - CHIEF COMPLAINT QUOTE
Pt c/o left eye pain beginning a few days ago, worse beginning yesterday. Reports BP is her baseline, denies dizziness. pmhx: ulcers in eye, rheumatoid arthritis Pt c/o left eye pain beginning a few days ago, worse beginning yesterday. endorsing blurry vision in left eye since yesterday. Reports BP is her baseline, denies dizziness. pmhx: ulcers in eye, rheumatoid arthritis

## 2025-03-20 NOTE — ED ADULT NURSE NOTE - OBJECTIVE STATEMENT
Patient A&o X4, history of anxiety, GERD, eye ulcers, received in room 5, with complaints of left eye pain. Patient states, "I have been having left eye pain for a few days but this morning it became worse". Patient denies any recent injury or trauma to eye, reports eye pain of 6 out of 10 currently, denies taking any medication prior to arrival. Patient able to speak in clear sentences, respirations equal and unlabored. Lung sounds clear b/l, equal chest rise and fall noted. Denies CP/SOB, fever, chills, nausea, vomiting and diarrhea at this time. Skin warm and dry. Provider at bedside for eval, pending further orders.

## 2025-03-20 NOTE — ED PROVIDER NOTE - PATIENT PORTAL LINK FT
You can access the FollowMyHealth Patient Portal offered by [facility] by registering at the following website: [email or web address]. By joining [facility]’s FollowMyHealth portal, you will also be able to view your health information using other applications (apps) compatible with our system.

## 2025-03-20 NOTE — ED ADULT NURSE NOTE - CHIEF COMPLAINT QUOTE
Pt c/o left eye pain beginning a few days ago, worse beginning yesterday. endorsing blurry vision in left eye since yesterday. Reports BP is her baseline, denies dizziness. pmhx: ulcers in eye, rheumatoid arthritis

## 2025-03-20 NOTE — ED PROVIDER NOTE - ATTENDING CONTRIBUTION TO CARE
54F Hasn't had vision checked in 3 years, c/o pain and redness in L eye worse over past 2 days, h/o corneal ulcer 2021.  ROS otherwise unremarkable.  20/40 in L and 20/30 R.  No conjunctival injection or purulence.  EOMS intact.  No fluorescein uptake to cornea, neg Joe's sign.  Pressure 11 OD 13 OS.  Impression eye discomfort.  Possibly allergic vs mild viral conjunctivitis vs rheumatologic condition of eye.  No purulence, fever, swelling, conjunctival injection to suggest bacterial conjunctivitis.  Rx allergy drops (given).  Follow up with ophtho as outpt.    VS:  unremarkable    GEN - NAD;   well appearing;   A+O x3   HEAD - NC/AT     ENT - PEERL, EOMI, mucous membranes    moist , no discharge   Vac  20/40 in L and 20/30 R.  No conjunctival injection or purulence.  EOMS intact.  No fluorescein uptake to cornea, neg Joe's sign.  Pressure 11 OD 13 OS.    NECK: Neck supple, non-tender without lymphadenopathy, no masses, no JVD  PULM - CTA b/l,  symmetric breath sounds  COR -  normal heart sounds    ABD - , ND, NT, soft,  BACK - no CVA tenderness, nontender spine     EXTREMS - no edema, no deformity, warm and well perfused    SKIN - no rash    or bruising      NEUROLOGIC - alert, face symmetric, speech fluent, sensation nl, motor no focal deficit.

## 2025-03-20 NOTE — ED ADULT TRIAGE NOTE - BP NONINVASIVE SYSTOLIC (MM HG)
"              After Visit Summary   2018    Toni Haley    MRN: [id number]           Patient Information     Date Of Birth          1995        Visit Information        Provider Department      2018 3:00 PM MNANY PHILLIPS/LPN [facility]        Today's Diagnoses     Need for prophylactic vaccination and inoculation against influenza    -  1       Follow-ups after your visit        Who to contact     If you have questions or need follow up information about today's clinic visit or your schedule please contact [facility] directly at [phone].  Normal or non-critical lab and imaging results will be communicated to you by [employer], letter or phone within 4 business days after the clinic has received the results. If you do not hear from us within 7 days, please contact the clinic through [employer] or phone. If you have a critical or abnormal lab result, we will notify you by phone as soon as possible.  Submit refill requests through [email or web address] or call your pharmacy and they will forward the refill request to us. Please allow 3 business days for your refill to be completed.          Additional Information About Your Visit        MyChart Information     [email or web address] lets you send messages to your doctor, view your test results, renew your prescriptions, schedule appointments and more. To sign up, go to [email or web address] . Click on \"Log in\" on the left side of the screen, which will take you to the Welcome page. Then click on \"Sign up Now\" on the right side of the page.     You will be asked to enter the access code listed below, as well as some personal information. Please follow the directions to create your username and password.     Your access code is: [email or web address]  Expires: 2018  3:17 PM     Your access code will  in 90 days. If you need help or a new code, please call your [facility] or [phone].        Care EveryWhere ID     This is your Care EveryWhere ID. " This could be used by other organizations to access your [facility] medical records  [id number]         Blood Pressure from Last 3 Encounters:   06/25/16 118/78   06/20/14 132/76   05/17/13 126/76    Weight from Last 3 Encounters:   06/25/16 266 lb 9.6 oz (120.9 kg)   06/20/14 237 lb (107.5 kg)   05/17/13 231 lb (104.8 kg) (98 %)*     * Growth percentiles are based on [facility] 2-20 Years data.              We Performed the Following     FLU VAC, SPLIT VIRUS IM > 3 YO (QUADRIVALENT) [66888]     Vaccine Administration, Initial [77994]        Primary Care Provider Office Phone # Fax #    Francisco Bartlett [phone] [address] [facility] [address]        Equal Access to Services     [facility] : Hadii tony weinberg hadasho Soomaali, waaxda luqadaha, qaybta kaalmada adeangiyada, cassandra lawrence . So [facility] [phone].    ATENCIÓN: Si habla español, tiene a rose disposición servicios gratuitos de asistencia lingüística. Llame al [phone].    We comply with applicable federal civil rights laws and [place] laws. We do not discriminate on the basis of race, color, national origin, age, disability, sex, sexual orientation, or gender identity.            Thank you!     Thank you for choosing [facility]  for your care. Our goal is always to provide you with excellent care. Hearing back from our patients is one way we can continue to improve our services. Please take a few minutes to complete the written survey that you may receive in the mail after your visit with us. Thank you!             Your Updated Medication List - Protect others around you: Learn how to safely use, store and throw away your medicines at www.disposemymeds.org.          This list is accurate as of 1/30/18  3:17 PM.  Always use your most recent med list.                   Brand Name Dispense Instructions for use Diagnosis    fluticasone 50 MCG/ACT spray    FLONASE    16 g    Spray 1 spray  into both nostrils 2 times daily as needed for rhinitis or allergies    Dysfunction of Eustachian tube, bilateral          95

## 2025-03-20 NOTE — ED PROVIDER NOTE - NSPTACCESSSVCSAPPTDETAILS_ED_ALL_ED_FT
Please help patient arrange follow-up with an ophthalmologist for left eye pain and itchiness Please help patient arrange follow-up with an ophthalmologist for left eye pain and itchiness  within 1 week please

## 2025-08-20 ENCOUNTER — EMERGENCY (EMERGENCY)
Facility: HOSPITAL | Age: 55
LOS: 1 days | End: 2025-08-20
Attending: EMERGENCY MEDICINE
Payer: MEDICAID

## 2025-08-20 VITALS
HEART RATE: 64 BPM | OXYGEN SATURATION: 94 % | TEMPERATURE: 98 F | RESPIRATION RATE: 17 BRPM | SYSTOLIC BLOOD PRESSURE: 118 MMHG | DIASTOLIC BLOOD PRESSURE: 75 MMHG

## 2025-08-20 VITALS
HEART RATE: 73 BPM | DIASTOLIC BLOOD PRESSURE: 64 MMHG | OXYGEN SATURATION: 95 % | HEIGHT: 65 IN | RESPIRATION RATE: 18 BRPM | TEMPERATURE: 98 F | SYSTOLIC BLOOD PRESSURE: 117 MMHG | WEIGHT: 177.25 LBS

## 2025-08-20 DIAGNOSIS — Z98.89 OTHER SPECIFIED POSTPROCEDURAL STATES: Chronic | ICD-10-CM

## 2025-08-20 LAB
ACETONE SERPL-MCNC: NEGATIVE — SIGNIFICANT CHANGE UP
ALBUMIN SERPL ELPH-MCNC: 3.3 G/DL — LOW (ref 3.5–5)
ALP SERPL-CCNC: 89 U/L — SIGNIFICANT CHANGE UP (ref 40–120)
ALT FLD-CCNC: 18 U/L DA — SIGNIFICANT CHANGE UP (ref 10–60)
ANION GAP SERPL CALC-SCNC: 3 MMOL/L — LOW (ref 5–17)
AST SERPL-CCNC: 15 U/L — SIGNIFICANT CHANGE UP (ref 10–40)
BASOPHILS # BLD AUTO: 0.05 K/UL — SIGNIFICANT CHANGE UP (ref 0–0.2)
BASOPHILS NFR BLD AUTO: 0.8 % — SIGNIFICANT CHANGE UP (ref 0–2)
BILIRUB SERPL-MCNC: 0.3 MG/DL — SIGNIFICANT CHANGE UP (ref 0.2–1.2)
BUN SERPL-MCNC: 12 MG/DL — SIGNIFICANT CHANGE UP (ref 7–18)
CALCIUM SERPL-MCNC: 9.2 MG/DL — SIGNIFICANT CHANGE UP (ref 8.4–10.5)
CHLORIDE SERPL-SCNC: 110 MMOL/L — HIGH (ref 96–108)
CK SERPL-CCNC: 106 U/L — SIGNIFICANT CHANGE UP (ref 21–215)
CO2 SERPL-SCNC: 28 MMOL/L — SIGNIFICANT CHANGE UP (ref 22–31)
CREAT SERPL-MCNC: 0.77 MG/DL — SIGNIFICANT CHANGE UP (ref 0.5–1.3)
EGFR: 90 ML/MIN/1.73M2 — SIGNIFICANT CHANGE UP
EGFR: 90 ML/MIN/1.73M2 — SIGNIFICANT CHANGE UP
EOSINOPHIL # BLD AUTO: 0.27 K/UL — SIGNIFICANT CHANGE UP (ref 0–0.5)
EOSINOPHIL NFR BLD AUTO: 4.4 % — SIGNIFICANT CHANGE UP (ref 0–6)
GLUCOSE SERPL-MCNC: 88 MG/DL — SIGNIFICANT CHANGE UP (ref 70–99)
HCT VFR BLD CALC: 36.1 % — SIGNIFICANT CHANGE UP (ref 34.5–45)
HGB BLD-MCNC: 12.3 G/DL — SIGNIFICANT CHANGE UP (ref 11.5–15.5)
IMM GRANULOCYTES NFR BLD AUTO: 0.3 % — SIGNIFICANT CHANGE UP (ref 0–0.9)
LYMPHOCYTES # BLD AUTO: 1.83 K/UL — SIGNIFICANT CHANGE UP (ref 1–3.3)
LYMPHOCYTES # BLD AUTO: 29.6 % — SIGNIFICANT CHANGE UP (ref 13–44)
MAGNESIUM SERPL-MCNC: 2 MG/DL — SIGNIFICANT CHANGE UP (ref 1.6–2.6)
MCHC RBC-ENTMCNC: 31.9 PG — SIGNIFICANT CHANGE UP (ref 27–34)
MCHC RBC-ENTMCNC: 34.1 G/DL — SIGNIFICANT CHANGE UP (ref 32–36)
MCV RBC AUTO: 93.5 FL — SIGNIFICANT CHANGE UP (ref 80–100)
MONOCYTES # BLD AUTO: 0.52 K/UL — SIGNIFICANT CHANGE UP (ref 0–0.9)
MONOCYTES NFR BLD AUTO: 8.4 % — SIGNIFICANT CHANGE UP (ref 2–14)
NEUTROPHILS # BLD AUTO: 3.49 K/UL — SIGNIFICANT CHANGE UP (ref 1.8–7.4)
NEUTROPHILS NFR BLD AUTO: 56.5 % — SIGNIFICANT CHANGE UP (ref 43–77)
NRBC BLD AUTO-RTO: 0 /100 WBCS — SIGNIFICANT CHANGE UP (ref 0–0)
PLATELET # BLD AUTO: 254 K/UL — SIGNIFICANT CHANGE UP (ref 150–400)
POTASSIUM SERPL-MCNC: 4.3 MMOL/L — SIGNIFICANT CHANGE UP (ref 3.5–5.3)
POTASSIUM SERPL-SCNC: 4.3 MMOL/L — SIGNIFICANT CHANGE UP (ref 3.5–5.3)
PROT SERPL-MCNC: 7 G/DL — SIGNIFICANT CHANGE UP (ref 6–8.3)
RBC # BLD: 3.86 M/UL — SIGNIFICANT CHANGE UP (ref 3.8–5.2)
RBC # FLD: 11.9 % — SIGNIFICANT CHANGE UP (ref 10.3–14.5)
SODIUM SERPL-SCNC: 141 MMOL/L — SIGNIFICANT CHANGE UP (ref 135–145)
TROPONIN I, HIGH SENSITIVITY RESULT: 4.4 NG/L — SIGNIFICANT CHANGE UP
WBC # BLD: 6.18 K/UL — SIGNIFICANT CHANGE UP (ref 3.8–10.5)
WBC # FLD AUTO: 6.18 K/UL — SIGNIFICANT CHANGE UP (ref 3.8–10.5)

## 2025-08-20 PROCEDURE — 84443 ASSAY THYROID STIM HORMONE: CPT

## 2025-08-20 PROCEDURE — 85025 COMPLETE CBC W/AUTO DIFF WBC: CPT

## 2025-08-20 PROCEDURE — 99285 EMERGENCY DEPT VISIT HI MDM: CPT

## 2025-08-20 PROCEDURE — 36415 COLL VENOUS BLD VENIPUNCTURE: CPT

## 2025-08-20 PROCEDURE — 86803 HEPATITIS C AB TEST: CPT

## 2025-08-20 PROCEDURE — 70450 CT HEAD/BRAIN W/O DYE: CPT | Mod: 26

## 2025-08-20 PROCEDURE — 83735 ASSAY OF MAGNESIUM: CPT

## 2025-08-20 PROCEDURE — 99285 EMERGENCY DEPT VISIT HI MDM: CPT | Mod: 25

## 2025-08-20 PROCEDURE — 82009 KETONE BODYS QUAL: CPT

## 2025-08-20 PROCEDURE — 93005 ELECTROCARDIOGRAM TRACING: CPT

## 2025-08-20 PROCEDURE — 70450 CT HEAD/BRAIN W/O DYE: CPT

## 2025-08-20 PROCEDURE — 80053 COMPREHEN METABOLIC PANEL: CPT

## 2025-08-20 PROCEDURE — 84484 ASSAY OF TROPONIN QUANT: CPT

## 2025-08-20 PROCEDURE — 82550 ASSAY OF CK (CPK): CPT

## 2025-08-20 PROCEDURE — 96374 THER/PROPH/DIAG INJ IV PUSH: CPT

## 2025-08-20 PROCEDURE — 84436 ASSAY OF TOTAL THYROXINE: CPT

## 2025-08-20 RX ORDER — MECLIZINE HCL 12.5 MG
12.5 TABLET ORAL ONCE
Refills: 0 | Status: COMPLETED | OUTPATIENT
Start: 2025-08-20 | End: 2025-08-20

## 2025-08-20 RX ORDER — METOCLOPRAMIDE HCL 10 MG
10 TABLET ORAL ONCE
Refills: 0 | Status: COMPLETED | OUTPATIENT
Start: 2025-08-20 | End: 2025-08-20

## 2025-08-20 RX ORDER — LORAZEPAM 4 MG/ML
1 VIAL (ML) INJECTION ONCE
Refills: 0 | Status: DISCONTINUED | OUTPATIENT
Start: 2025-08-20 | End: 2025-08-20

## 2025-08-20 RX ADMIN — Medication 1 MILLIGRAM(S): at 19:45

## 2025-08-20 RX ADMIN — Medication 104 MILLIGRAM(S): at 18:27

## 2025-08-20 RX ADMIN — Medication 12.5 MILLIGRAM(S): at 18:27

## 2025-08-20 RX ADMIN — Medication 125 MILLILITER(S): at 18:28

## 2025-08-21 LAB
HCV AB S/CO SERPL IA: 0.18 S/CO — SIGNIFICANT CHANGE UP (ref 0–0.79)
HCV AB SERPL-IMP: SIGNIFICANT CHANGE UP

## 2025-08-21 RX ORDER — ONDANSETRON HCL/PF 4 MG/2 ML
1 VIAL (ML) INJECTION
Qty: 1 | Refills: 0
Start: 2025-08-21 | End: 2025-08-23

## 2025-08-21 RX ORDER — MECLIZINE HCL 12.5 MG
1 TABLET ORAL
Qty: 7 | Refills: 0
Start: 2025-08-21 | End: 2025-08-27